# Patient Record
Sex: FEMALE | Race: WHITE | NOT HISPANIC OR LATINO | Employment: OTHER | URBAN - METROPOLITAN AREA
[De-identification: names, ages, dates, MRNs, and addresses within clinical notes are randomized per-mention and may not be internally consistent; named-entity substitution may affect disease eponyms.]

---

## 2023-04-12 ENCOUNTER — TELEPHONE (OUTPATIENT)
Dept: PRIMARY CARE | Facility: CLINIC | Age: 78
End: 2023-04-12
Payer: MEDICARE

## 2023-04-12 DIAGNOSIS — E03.9 ACQUIRED HYPOTHYROIDISM: Primary | ICD-10-CM

## 2023-04-12 RX ORDER — LEVOTHYROXINE SODIUM 88 UG/1
88 TABLET ORAL DAILY
COMMUNITY
End: 2023-04-12 | Stop reason: SDUPTHER

## 2023-04-13 RX ORDER — LEVOTHYROXINE SODIUM 88 UG/1
88 TABLET ORAL DAILY
Qty: 30 TABLET | Refills: 0 | Status: SHIPPED | OUTPATIENT
Start: 2023-04-13 | End: 2023-05-11 | Stop reason: SDUPTHER

## 2023-04-19 ENCOUNTER — LAB (OUTPATIENT)
Dept: LAB | Facility: LAB | Age: 78
End: 2023-04-19
Payer: MEDICARE

## 2023-04-19 ENCOUNTER — OFFICE VISIT (OUTPATIENT)
Dept: PRIMARY CARE | Facility: CLINIC | Age: 78
End: 2023-04-19
Payer: MEDICARE

## 2023-04-19 VITALS
TEMPERATURE: 97.2 F | SYSTOLIC BLOOD PRESSURE: 110 MMHG | HEIGHT: 65 IN | BODY MASS INDEX: 21.69 KG/M2 | WEIGHT: 130.2 LBS | HEART RATE: 72 BPM | DIASTOLIC BLOOD PRESSURE: 60 MMHG | RESPIRATION RATE: 16 BRPM

## 2023-04-19 DIAGNOSIS — M54.50 CHRONIC BILATERAL LOW BACK PAIN WITHOUT SCIATICA: ICD-10-CM

## 2023-04-19 DIAGNOSIS — E78.5 DYSLIPIDEMIA: ICD-10-CM

## 2023-04-19 DIAGNOSIS — Z00.00 ROUTINE GENERAL MEDICAL EXAMINATION AT HEALTH CARE FACILITY: ICD-10-CM

## 2023-04-19 DIAGNOSIS — Z00.00 MEDICARE ANNUAL WELLNESS VISIT, SUBSEQUENT: Primary | ICD-10-CM

## 2023-04-19 DIAGNOSIS — Z12.31 ENCOUNTER FOR SCREENING MAMMOGRAM FOR MALIGNANT NEOPLASM OF BREAST: ICD-10-CM

## 2023-04-19 DIAGNOSIS — E53.8 VITAMIN B12 DEFICIENCY: ICD-10-CM

## 2023-04-19 DIAGNOSIS — R53.1 WEAKNESS GENERALIZED: ICD-10-CM

## 2023-04-19 DIAGNOSIS — G89.29 CHRONIC BILATERAL LOW BACK PAIN WITHOUT SCIATICA: ICD-10-CM

## 2023-04-19 DIAGNOSIS — E03.9 ACQUIRED HYPOTHYROIDISM: ICD-10-CM

## 2023-04-19 DIAGNOSIS — D72.819 LEUKOPENIA, UNSPECIFIED TYPE: ICD-10-CM

## 2023-04-19 DIAGNOSIS — M19.90 OSTEOARTHRITIS, UNSPECIFIED OSTEOARTHRITIS TYPE, UNSPECIFIED SITE: ICD-10-CM

## 2023-04-19 DIAGNOSIS — E55.9 VITAMIN D DEFICIENCY: ICD-10-CM

## 2023-04-19 DIAGNOSIS — M81.0 AGE-RELATED OSTEOPOROSIS WITHOUT CURRENT PATHOLOGICAL FRACTURE: ICD-10-CM

## 2023-04-19 DIAGNOSIS — R91.1 NODULE OF RIGHT LUNG: ICD-10-CM

## 2023-04-19 PROBLEM — C44.319 BASAL CELL CARCINOMA OF CHEEK: Status: ACTIVE | Noted: 2023-04-19

## 2023-04-19 PROBLEM — M95.2 MOHS DEFECT OF CHEEK: Status: ACTIVE | Noted: 2023-04-19

## 2023-04-19 PROBLEM — M85.80 OSTEOPENIA: Status: ACTIVE | Noted: 2023-04-19

## 2023-04-19 PROBLEM — M47.812 CERVICAL ARTHRITIS: Status: ACTIVE | Noted: 2023-04-19

## 2023-04-19 PROBLEM — M54.2 CERVICAL MUSCLE PAIN: Status: ACTIVE | Noted: 2023-04-19

## 2023-04-19 PROBLEM — Z98.890 MOHS DEFECT OF CHEEK: Status: ACTIVE | Noted: 2023-04-19

## 2023-04-19 PROBLEM — M48.00 SPINAL STENOSIS: Status: ACTIVE | Noted: 2023-04-19

## 2023-04-19 PROBLEM — I65.29 CAROTID ARTERY CALCIFICATION: Status: ACTIVE | Noted: 2023-04-19

## 2023-04-19 PROBLEM — H93.13 BILATERAL TINNITUS: Status: ACTIVE | Noted: 2022-03-02

## 2023-04-19 PROBLEM — H91.10 PRESBYCUSIS: Status: ACTIVE | Noted: 2023-04-19

## 2023-04-19 PROBLEM — H66.92 LEFT OTITIS MEDIA: Status: ACTIVE | Noted: 2022-03-02

## 2023-04-19 PROBLEM — R51.9 HEADACHE: Status: ACTIVE | Noted: 2023-04-19

## 2023-04-19 PROBLEM — B02.9 SHINGLES: Status: ACTIVE | Noted: 2023-04-19

## 2023-04-19 PROBLEM — M25.551 HIP PAIN, RIGHT: Status: ACTIVE | Noted: 2023-04-19

## 2023-04-19 PROBLEM — H35.30 MACULAR DEGENERATION: Status: ACTIVE | Noted: 2023-04-19

## 2023-04-19 LAB
ALANINE AMINOTRANSFERASE (SGPT) (U/L) IN SER/PLAS: 17 U/L (ref 7–45)
ALBUMIN (G/DL) IN SER/PLAS: 4.4 G/DL (ref 3.4–5)
ALKALINE PHOSPHATASE (U/L) IN SER/PLAS: 69 U/L (ref 33–136)
ANION GAP IN SER/PLAS: 12 MMOL/L (ref 10–20)
ASPARTATE AMINOTRANSFERASE (SGOT) (U/L) IN SER/PLAS: 23 U/L (ref 9–39)
BASOPHILS (10*3/UL) IN BLOOD BY AUTOMATED COUNT: 0.05 X10E9/L (ref 0–0.1)
BASOPHILS/100 LEUKOCYTES IN BLOOD BY AUTOMATED COUNT: 1 % (ref 0–2)
BILIRUBIN TOTAL (MG/DL) IN SER/PLAS: 0.5 MG/DL (ref 0–1.2)
CALCIDIOL (25 OH VITAMIN D3) (NG/ML) IN SER/PLAS: 32 NG/ML
CALCIUM (MG/DL) IN SER/PLAS: 9.7 MG/DL (ref 8.6–10.3)
CARBON DIOXIDE, TOTAL (MMOL/L) IN SER/PLAS: 27 MMOL/L (ref 21–32)
CHLORIDE (MMOL/L) IN SER/PLAS: 103 MMOL/L (ref 98–107)
CHOLESTEROL (MG/DL) IN SER/PLAS: 181 MG/DL (ref 0–199)
CHOLESTEROL IN HDL (MG/DL) IN SER/PLAS: 56.7 MG/DL
CHOLESTEROL/HDL RATIO: 3.2
COBALAMIN (VITAMIN B12) (PG/ML) IN SER/PLAS: 690 PG/ML (ref 211–911)
CREATININE (MG/DL) IN SER/PLAS: 0.85 MG/DL (ref 0.5–1.05)
EOSINOPHILS (10*3/UL) IN BLOOD BY AUTOMATED COUNT: 0.24 X10E9/L (ref 0–0.4)
EOSINOPHILS/100 LEUKOCYTES IN BLOOD BY AUTOMATED COUNT: 4.9 % (ref 0–6)
ERYTHROCYTE DISTRIBUTION WIDTH (RATIO) BY AUTOMATED COUNT: 12.3 % (ref 11.5–14.5)
ERYTHROCYTE MEAN CORPUSCULAR HEMOGLOBIN CONCENTRATION (G/DL) BY AUTOMATED: 33.7 G/DL (ref 32–36)
ERYTHROCYTE MEAN CORPUSCULAR VOLUME (FL) BY AUTOMATED COUNT: 93 FL (ref 80–100)
ERYTHROCYTES (10*6/UL) IN BLOOD BY AUTOMATED COUNT: 4.01 X10E12/L (ref 4–5.2)
GFR FEMALE: 70 ML/MIN/1.73M2
GLUCOSE (MG/DL) IN SER/PLAS: 85 MG/DL (ref 74–99)
HEMATOCRIT (%) IN BLOOD BY AUTOMATED COUNT: 37.1 % (ref 36–46)
HEMOGLOBIN (G/DL) IN BLOOD: 12.5 G/DL (ref 12–16)
IMMATURE GRANULOCYTES/100 LEUKOCYTES IN BLOOD BY AUTOMATED COUNT: 0.2 % (ref 0–0.9)
LDL: 105 MG/DL (ref 0–99)
LEUKOCYTES (10*3/UL) IN BLOOD BY AUTOMATED COUNT: 4.9 X10E9/L (ref 4.4–11.3)
LYMPHOCYTES (10*3/UL) IN BLOOD BY AUTOMATED COUNT: 1.33 X10E9/L (ref 0.8–3)
LYMPHOCYTES/100 LEUKOCYTES IN BLOOD BY AUTOMATED COUNT: 26.9 % (ref 13–44)
MONOCYTES (10*3/UL) IN BLOOD BY AUTOMATED COUNT: 0.75 X10E9/L (ref 0.05–0.8)
MONOCYTES/100 LEUKOCYTES IN BLOOD BY AUTOMATED COUNT: 15.2 % (ref 2–10)
NEUTROPHILS (10*3/UL) IN BLOOD BY AUTOMATED COUNT: 2.56 X10E9/L (ref 1.6–5.5)
NEUTROPHILS/100 LEUKOCYTES IN BLOOD BY AUTOMATED COUNT: 51.8 % (ref 40–80)
PLATELETS (10*3/UL) IN BLOOD AUTOMATED COUNT: 230 X10E9/L (ref 150–450)
POTASSIUM (MMOL/L) IN SER/PLAS: 4.4 MMOL/L (ref 3.5–5.3)
PROTEIN TOTAL: 7 G/DL (ref 6.4–8.2)
SODIUM (MMOL/L) IN SER/PLAS: 138 MMOL/L (ref 136–145)
THYROTROPIN (MIU/L) IN SER/PLAS BY DETECTION LIMIT <= 0.05 MIU/L: 2.03 MIU/L (ref 0.44–3.98)
TRIGLYCERIDE (MG/DL) IN SER/PLAS: 98 MG/DL (ref 0–149)
UREA NITROGEN (MG/DL) IN SER/PLAS: 17 MG/DL (ref 6–23)
VLDL: 20 MG/DL (ref 0–40)

## 2023-04-19 PROCEDURE — 1160F RVW MEDS BY RX/DR IN RCRD: CPT | Performed by: INTERNAL MEDICINE

## 2023-04-19 PROCEDURE — 84443 ASSAY THYROID STIM HORMONE: CPT

## 2023-04-19 PROCEDURE — 85025 COMPLETE CBC W/AUTO DIFF WBC: CPT

## 2023-04-19 PROCEDURE — 80061 LIPID PANEL: CPT

## 2023-04-19 PROCEDURE — 1170F FXNL STATUS ASSESSED: CPT | Performed by: INTERNAL MEDICINE

## 2023-04-19 PROCEDURE — 1159F MED LIST DOCD IN RCRD: CPT | Performed by: INTERNAL MEDICINE

## 2023-04-19 PROCEDURE — 82306 VITAMIN D 25 HYDROXY: CPT

## 2023-04-19 PROCEDURE — G0439 PPPS, SUBSEQ VISIT: HCPCS | Performed by: INTERNAL MEDICINE

## 2023-04-19 PROCEDURE — 36415 COLL VENOUS BLD VENIPUNCTURE: CPT

## 2023-04-19 PROCEDURE — 80053 COMPREHEN METABOLIC PANEL: CPT

## 2023-04-19 PROCEDURE — 82607 VITAMIN B-12: CPT

## 2023-04-19 RX ORDER — CALCIUM CARBONATE 500(1250)
TABLET,CHEWABLE ORAL
COMMUNITY

## 2023-04-19 RX ORDER — CYANOCOBALAMIN 1000 UG/ML
1000 INJECTION, SOLUTION INTRAMUSCULAR; SUBCUTANEOUS
COMMUNITY
End: 2023-06-02 | Stop reason: ALTCHOICE

## 2023-04-19 RX ORDER — CALCIUM CARBONATE/VITAMIN D3 600MG-5MCG
2 TABLET ORAL DAILY
COMMUNITY

## 2023-04-19 ASSESSMENT — PATIENT HEALTH QUESTIONNAIRE - PHQ9
2. FEELING DOWN, DEPRESSED OR HOPELESS: NOT AT ALL
SUM OF ALL RESPONSES TO PHQ9 QUESTIONS 1 AND 2: 0
1. LITTLE INTEREST OR PLEASURE IN DOING THINGS: NOT AT ALL

## 2023-04-19 ASSESSMENT — ACTIVITIES OF DAILY LIVING (ADL)
GROCERY_SHOPPING: INDEPENDENT
DRESSING: INDEPENDENT
BATHING: INDEPENDENT
TAKING_MEDICATION: INDEPENDENT
DOING_HOUSEWORK: INDEPENDENT
MANAGING_FINANCES: INDEPENDENT

## 2023-04-19 ASSESSMENT — ENCOUNTER SYMPTOMS
CONSTIPATION: 0
CHEST TIGHTNESS: 0
SLEEP DISTURBANCE: 0
ADENOPATHY: 0
DYSURIA: 0
UNEXPECTED WEIGHT CHANGE: 0
CHILLS: 0
WEAKNESS: 0
WHEEZING: 0
NAUSEA: 0
PALPITATIONS: 0
ABDOMINAL PAIN: 1
SORE THROAT: 0
CONFUSION: 0
ARTHRALGIAS: 1
SHORTNESS OF BREATH: 0
DIZZINESS: 1
NECK PAIN: 1
COUGH: 0
VOMITING: 0
FATIGUE: 1
DIARRHEA: 0
JOINT SWELLING: 0

## 2023-04-19 NOTE — PROGRESS NOTES
"Subjective   Reason for Visit: Yumiko Richter is an 77 y.o. female here for a Medicare Wellness visit.     Past Medical, Surgical, and Family History reviewed and updated in chart.    Reviewed all medications by prescribing practitioner or clinical pharmacist (such as prescriptions, OTCs, herbal therapies and supplements) and documented in the medical record.    AWV  Dexa-12.3.2021  Mammo-12.3.2021  Colonoscopy-2.9.2022-q10  Patient decline any vaccines today,no flu or covid vaccines done  R upper abd area pain 2-3 months now on/of aching pain     Feels more  fatigue lately, had vit b12 def, is on vit b12 inj qmonth,     Patient Care Team:  Syed Trinh MD as PCP - General  Syed Trinh MD as PCP - [employer] PCP     Review of Systems   Constitutional:  Positive for fatigue. Negative for chills and unexpected weight change.        Comment   HENT:  Negative for congestion, ear pain and sore throat.    Respiratory:  Negative for cough, chest tightness, shortness of breath and wheezing.    Cardiovascular:  Negative for palpitations and leg swelling.   Gastrointestinal:  Positive for abdominal pain. Negative for constipation, diarrhea, nausea and vomiting.        R upper abd area x 2-3 months on/off ,ache   Genitourinary:  Negative for dysuria and urgency.   Musculoskeletal:  Positive for arthralgias and neck pain. Negative for joint swelling.   Skin:  Negative for rash.   Neurological:  Positive for dizziness. Negative for weakness.   Hematological:  Negative for adenopathy.   Psychiatric/Behavioral:  Negative for confusion and sleep disturbance.    All other systems reviewed and are negative.      Objective   Vitals:  /60 (BP Location: Left arm, Patient Position: Sitting)   Pulse 72   Temp 36.2 °C (97.2 °F)   Resp 16   Ht 1.651 m (5' 5\")   Wt 59.1 kg (130 lb 3.2 oz)   BMI 21.67 kg/m²       Physical Exam  Constitutional:       Appearance: Normal appearance.   HENT:      Head: " Normocephalic and atraumatic.   Eyes:      Conjunctiva/sclera: Conjunctivae normal.   Neck:      Vascular: No carotid bruit.   Cardiovascular:      Rate and Rhythm: Normal rate and regular rhythm.      Heart sounds: No murmur heard.  Pulmonary:      Effort: No respiratory distress.      Breath sounds: No wheezing, rhonchi or rales.   Chest:      Chest wall: No tenderness.   Abdominal:      General: Bowel sounds are normal. There is no distension.      Palpations: Abdomen is soft. There is no mass.      Tenderness: There is no abdominal tenderness.   Musculoskeletal:      Cervical back: Neck supple.   Lymphadenopathy:      Cervical: No cervical adenopathy.   Skin:     Coloration: Skin is not jaundiced.      Findings: No rash.   Neurological:      General: No focal deficit present.      Mental Status: She is alert and oriented to person, place, and time. Mental status is at baseline.      Motor: No weakness.      Gait: Gait normal.   Psychiatric:         Mood and Affect: Mood normal.         Behavior: Behavior normal.         Judgment: Judgment normal.         Assessment/Plan   Problem List Items Addressed This Visit       Dyslipidemia    Hypothyroidism    Leucopenia    Nodule of right lung    Current Assessment & Plan     Will fu with ct chest         Osteoarthritis    Vitamin B12 deficiency    Vitamin D deficiency    Weakness generalized     Other Visit Diagnoses       Medicare annual wellness visit, subsequent    -  Primary    Routine general medical examination at health care facility

## 2023-05-11 ENCOUNTER — TELEPHONE (OUTPATIENT)
Dept: PRIMARY CARE | Facility: CLINIC | Age: 78
End: 2023-05-11
Payer: MEDICARE

## 2023-05-11 DIAGNOSIS — E03.9 ACQUIRED HYPOTHYROIDISM: Primary | ICD-10-CM

## 2023-05-12 RX ORDER — LEVOTHYROXINE SODIUM 88 UG/1
88 TABLET ORAL DAILY
Qty: 90 TABLET | Refills: 0 | Status: SHIPPED | OUTPATIENT
Start: 2023-05-12 | End: 2023-08-14 | Stop reason: SDUPTHER

## 2023-06-02 ENCOUNTER — OFFICE VISIT (OUTPATIENT)
Dept: PRIMARY CARE | Facility: CLINIC | Age: 78
End: 2023-06-02
Payer: MEDICARE

## 2023-06-02 VITALS
RESPIRATION RATE: 16 BRPM | WEIGHT: 130.8 LBS | SYSTOLIC BLOOD PRESSURE: 122 MMHG | HEART RATE: 69 BPM | OXYGEN SATURATION: 97 % | HEIGHT: 65 IN | DIASTOLIC BLOOD PRESSURE: 68 MMHG | BODY MASS INDEX: 21.79 KG/M2 | TEMPERATURE: 97.3 F

## 2023-06-02 DIAGNOSIS — Z00.00 ANNUAL PHYSICAL EXAM: Primary | ICD-10-CM

## 2023-06-02 DIAGNOSIS — Z13.6 SCREENING FOR CARDIOVASCULAR CONDITION: ICD-10-CM

## 2023-06-02 DIAGNOSIS — E55.9 VITAMIN D DEFICIENCY: ICD-10-CM

## 2023-06-02 DIAGNOSIS — R91.1 NODULE OF RIGHT LUNG: ICD-10-CM

## 2023-06-02 DIAGNOSIS — E78.5 DYSLIPIDEMIA: ICD-10-CM

## 2023-06-02 DIAGNOSIS — D72.819 LEUKOPENIA, UNSPECIFIED TYPE: ICD-10-CM

## 2023-06-02 DIAGNOSIS — E53.8 VITAMIN B12 DEFICIENCY: ICD-10-CM

## 2023-06-02 DIAGNOSIS — E03.9 ACQUIRED HYPOTHYROIDISM: ICD-10-CM

## 2023-06-02 PROBLEM — M17.12 PRIMARY OSTEOARTHRITIS OF LEFT KNEE: Status: ACTIVE | Noted: 2022-02-23

## 2023-06-02 PROBLEM — M17.0 PRIMARY OSTEOARTHRITIS OF BOTH KNEES: Status: ACTIVE | Noted: 2022-02-23

## 2023-06-02 PROCEDURE — G0446 INTENS BEHAVE THER CARDIO DX: HCPCS | Performed by: INTERNAL MEDICINE

## 2023-06-02 PROCEDURE — 99214 OFFICE O/P EST MOD 30 MIN: CPT | Performed by: INTERNAL MEDICINE

## 2023-06-02 PROCEDURE — 99497 ADVNCD CARE PLAN 30 MIN: CPT | Performed by: INTERNAL MEDICINE

## 2023-06-02 PROCEDURE — G0444 DEPRESSION SCREEN ANNUAL: HCPCS | Performed by: INTERNAL MEDICINE

## 2023-06-02 PROCEDURE — 99397 PER PM REEVAL EST PAT 65+ YR: CPT | Performed by: INTERNAL MEDICINE

## 2023-06-02 PROCEDURE — 1160F RVW MEDS BY RX/DR IN RCRD: CPT | Performed by: INTERNAL MEDICINE

## 2023-06-02 PROCEDURE — 1159F MED LIST DOCD IN RCRD: CPT | Performed by: INTERNAL MEDICINE

## 2023-06-02 RX ORDER — LANOLIN ALCOHOL/MO/W.PET/CERES
500 CREAM (GRAM) TOPICAL DAILY
Qty: 45 TABLET | Refills: 3 | Status: SHIPPED | OUTPATIENT
Start: 2023-06-02 | End: 2024-02-12 | Stop reason: DRUGHIGH

## 2023-06-02 ASSESSMENT — ENCOUNTER SYMPTOMS
NAUSEA: 0
PALPITATIONS: 0
ADENOPATHY: 0
SLEEP DISTURBANCE: 0
ARTHRALGIAS: 0
WEAKNESS: 0
CONFUSION: 0
FATIGUE: 0
DYSURIA: 0
CHILLS: 0
COUGH: 0
SORE THROAT: 0
DIZZINESS: 0
UNEXPECTED WEIGHT CHANGE: 0
DIARRHEA: 0
CHEST TIGHTNESS: 0
WHEEZING: 0
JOINT SWELLING: 0
VOMITING: 0
BACK PAIN: 1
SHORTNESS OF BREATH: 0
ABDOMINAL PAIN: 0
CONSTIPATION: 0

## 2023-06-02 NOTE — ASSESSMENT & PLAN NOTE
I have discussed the cardiovascular risk and behavioral modification, nutrition, exercise and elimination of habits contributing to risk. We agreed to a plan how to reduce the risk.  CV risk estimate calculates 20.2%  Will do ct calcium score

## 2023-06-02 NOTE — PROGRESS NOTES
"Subjective   Patient ID: Yumiko Richter is a 77 y.o. female who presents for Annual Exam (CPE/Labs- 4.2023/CT chest -5.2023).    CPE  Labs-4.2023  CT chest 5.2023  Colonoscopy- 2.9.2022-10y  Dexa-12.3.2021  Mammo-12.3.2021       Has living will in chart  Review of Systems   Constitutional:  Negative for chills, fatigue and unexpected weight change.        Comment   HENT:  Negative for congestion, ear pain and sore throat.    Respiratory:  Negative for cough, chest tightness, shortness of breath and wheezing.    Cardiovascular:  Negative for palpitations and leg swelling.   Gastrointestinal:  Negative for abdominal pain, constipation, diarrhea, nausea and vomiting.   Genitourinary:  Negative for dysuria and urgency.   Musculoskeletal:  Positive for back pain. Negative for arthralgias and joint swelling.        Upper back area sometimes   Skin:  Negative for rash.   Neurological:  Negative for dizziness and weakness.   Hematological:  Negative for adenopathy.   Psychiatric/Behavioral:  Negative for confusion and sleep disturbance.    All other systems reviewed and are negative.      Objective   /68 (BP Location: Left arm)   Pulse 69   Temp 36.3 °C (97.3 °F)   Resp 16   Ht 1.651 m (5' 5\")   Wt 59.3 kg (130 lb 12.8 oz)   SpO2 97% Comment: RA  BMI 21.77 kg/m²     Physical Exam  Constitutional:       Appearance: Normal appearance.   HENT:      Head: Normocephalic and atraumatic.   Eyes:      Conjunctiva/sclera: Conjunctivae normal.   Neck:      Vascular: No carotid bruit.   Cardiovascular:      Rate and Rhythm: Normal rate and regular rhythm.      Heart sounds: No murmur heard.  Pulmonary:      Effort: No respiratory distress.      Breath sounds: No wheezing, rhonchi or rales.   Chest:      Chest wall: No tenderness.   Abdominal:      General: Bowel sounds are normal. There is no distension.      Palpations: Abdomen is soft. There is no mass.      Tenderness: There is no abdominal tenderness.   Musculoskeletal:   "       General: Normal range of motion.      Cervical back: Neck supple.   Lymphadenopathy:      Cervical: No cervical adenopathy.   Skin:     Coloration: Skin is not jaundiced.      Findings: No rash.   Neurological:      General: No focal deficit present.      Mental Status: She is alert and oriented to person, place, and time. Mental status is at baseline.      Motor: No weakness.      Gait: Gait normal.   Psychiatric:         Mood and Affect: Mood normal.         Behavior: Behavior normal.         Judgment: Judgment normal.         Assessment/Plan   Problem List Items Addressed This Visit       Dyslipidemia    Hypothyroidism    Leucopenia     resolved         Nodule of right lung     Ct has stable from 2017, on ct 2023.  No more ct lung         Vitamin B12 deficiency     Was on b=vitb12 inj  once /month + po every day  Thomas;l stop injection and continue po, recheck in 6 month         Relevant Medications    cyanocobalamin (Vitamin B-12) 1,000 mcg tablet    Other Relevant Orders    Vitamin B12    Vitamin D deficiency    Screening for cardiovascular condition - Primary     I have discussed the cardiovascular risk and behavioral modification, nutrition, exercise and elimination of habits contributing to risk. We agreed to a plan how to reduce the risk.  CV risk estimate calculates 20.2%  Will do ct calcium score

## 2023-06-02 NOTE — ASSESSMENT & PLAN NOTE
Was on b=vitb12 inj  once /month + po every day  Thomas;l stop injection and continue po, recheck in 6 month

## 2023-08-04 ENCOUNTER — APPOINTMENT (OUTPATIENT)
Dept: PRIMARY CARE | Facility: CLINIC | Age: 78
End: 2023-08-04
Payer: MEDICARE

## 2023-08-14 ENCOUNTER — TELEPHONE (OUTPATIENT)
Dept: PRIMARY CARE | Facility: CLINIC | Age: 78
End: 2023-08-14
Payer: MEDICARE

## 2023-08-14 DIAGNOSIS — E03.9 ACQUIRED HYPOTHYROIDISM: Primary | ICD-10-CM

## 2023-08-14 RX ORDER — LEVOTHYROXINE SODIUM 88 UG/1
88 TABLET ORAL DAILY
Qty: 90 TABLET | Refills: 3 | Status: SHIPPED | OUTPATIENT
Start: 2023-08-14

## 2023-08-14 NOTE — TELEPHONE ENCOUNTER
Refill 90 day supply w/Refills  to [facility]    -levothyroxine (Synthroid, Levoxyl) 88 mcg tablet 1XDAY

## 2023-09-11 ENCOUNTER — OFFICE VISIT (OUTPATIENT)
Dept: PRIMARY CARE | Facility: CLINIC | Age: 78
End: 2023-09-11
Payer: MEDICARE

## 2023-09-11 VITALS
TEMPERATURE: 97.3 F | BODY MASS INDEX: 21.76 KG/M2 | OXYGEN SATURATION: 97 % | RESPIRATION RATE: 18 BRPM | WEIGHT: 130.6 LBS | SYSTOLIC BLOOD PRESSURE: 120 MMHG | DIASTOLIC BLOOD PRESSURE: 60 MMHG | HEIGHT: 65 IN | HEART RATE: 76 BPM

## 2023-09-11 DIAGNOSIS — R05.1 ACUTE COUGH: ICD-10-CM

## 2023-09-11 DIAGNOSIS — M54.6 BILATERAL THORACIC BACK PAIN, UNSPECIFIED CHRONICITY: ICD-10-CM

## 2023-09-11 DIAGNOSIS — J04.0 ACUTE SIMPLE LARYNGITIS: ICD-10-CM

## 2023-09-11 DIAGNOSIS — R91.8 LUNG NODULES: Primary | ICD-10-CM

## 2023-09-11 PROCEDURE — 1160F RVW MEDS BY RX/DR IN RCRD: CPT | Performed by: INTERNAL MEDICINE

## 2023-09-11 PROCEDURE — 99214 OFFICE O/P EST MOD 30 MIN: CPT | Performed by: INTERNAL MEDICINE

## 2023-09-11 PROCEDURE — 1159F MED LIST DOCD IN RCRD: CPT | Performed by: INTERNAL MEDICINE

## 2023-09-11 RX ORDER — METHYLPREDNISOLONE 4 MG/1
TABLET ORAL
Qty: 21 TABLET | Refills: 0 | Status: SHIPPED | OUTPATIENT
Start: 2023-09-11 | End: 2024-02-12 | Stop reason: ALTCHOICE

## 2023-09-11 RX ORDER — BENZONATATE 100 MG/1
100 CAPSULE ORAL 3 TIMES DAILY PRN
Qty: 42 CAPSULE | Refills: 0 | Status: SHIPPED | OUTPATIENT
Start: 2023-09-11 | End: 2023-10-11

## 2023-09-11 ASSESSMENT — PATIENT HEALTH QUESTIONNAIRE - PHQ9
1. LITTLE INTEREST OR PLEASURE IN DOING THINGS: NOT AT ALL
2. FEELING DOWN, DEPRESSED OR HOPELESS: NOT AT ALL
SUM OF ALL RESPONSES TO PHQ9 QUESTIONS 1 AND 2: 0

## 2023-09-11 ASSESSMENT — ENCOUNTER SYMPTOMS: COUGH: 1

## 2023-09-11 NOTE — PROGRESS NOTES
"Subjective   Yumiko Richter is a 78 y.o. female who presents for Cough (Cough - dry x 1 week ).  Dry cough x 1 week dry, did not take ant TX OTC, drink fide, honey QAM, no other URI symptoms, no NVD, no chills,puts a pillow higher at night to sleep and helps. Her  used bleach in basement and she started with sore throat and cough that night. Working like a   , ant tolerate some of the .  Lost her voice few days ago too, is better now.  CT chest done 5.9.2023- to talk today regarding if to repeat in future or not    in the room with patient today    Eats a lot of tomatoes and grapes    Cough  This is a recurrent problem. The current episode started in the past 7 days. The problem has been unchanged. The problem occurs constantly. The cough is Non-productive. The symptoms are aggravated by lying down. She has tried nothing for the symptoms. There is no history of asthma.     Review of Systems   Respiratory:  Positive for cough.        Objective   Physical Exam  Constitutional:       Appearance: Normal appearance.   HENT:      Head: Normocephalic and atraumatic.   Eyes:      Pupils: Pupils are equal, round, and reactive to light.   Cardiovascular:      Rate and Rhythm: Normal rate and regular rhythm.   Pulmonary:      Effort: Pulmonary effort is normal.      Breath sounds: Normal breath sounds.   Musculoskeletal:         General: Normal range of motion.      Cervical back: Normal range of motion and neck supple.   Skin:     General: Skin is warm.   Neurological:      General: No focal deficit present.      Mental Status: She is alert and oriented to person, place, and time.   Psychiatric:         Mood and Affect: Mood normal.         Behavior: Behavior normal.       /60 (BP Location: Left arm, Patient Position: Sitting)   Pulse 76   Temp 36.3 °C (97.3 °F)   Resp 18   Ht 1.651 m (5' 5\")   Wt 59.2 kg (130 lb 9.6 oz)   SpO2 97% Comment: RA  BMI 21.73 kg/m²   === 04/19/23 " ===    CT CHEST WO IV CONTRAST    - Impression -  Stable bilateral subcentimeter pulmonary nodules compared to studies  dating back to 03/15/2017. Many of these nodules are stable compared  to older study from 03/18/2014.  No new or enlarging pulmonary nodule.  Fleischner guidelines: Multiple non-calcified pulmonary nodules  measuring less than 6 mm, likely benign. No further follow-up is  required, however, if the patient has high risk factors for primary  lung malignancy, follow-up noncontrast CT scan chest in 12 months may  be obtained. (Ron López et al., Guidelines for management of  incidental pulmonary nodules detected on CT images: From the  Fleischner Society 2017, Radiology. 2017 Jul;284 (1):228-243.)  [email or web address]      Assessment/Plan   Problem List Items Addressed This Visit       Lung nodules - Primary     Stable on ct 2023 since 2014, not smoking, no need for further ct         Acute cough     Other Visit Diagnoses       Acute simple laryngitis        possible sec to iritation from clorine    Bilateral thoracic back pain, unspecified chronicity

## 2023-12-04 ENCOUNTER — APPOINTMENT (OUTPATIENT)
Dept: PRIMARY CARE | Facility: CLINIC | Age: 78
End: 2023-12-04
Payer: MEDICARE

## 2023-12-07 ENCOUNTER — OFFICE VISIT (OUTPATIENT)
Dept: PRIMARY CARE | Facility: CLINIC | Age: 78
End: 2023-12-07
Payer: MEDICARE

## 2023-12-07 ENCOUNTER — LAB (OUTPATIENT)
Dept: LAB | Facility: LAB | Age: 78
End: 2023-12-07
Payer: MEDICARE

## 2023-12-07 VITALS
OXYGEN SATURATION: 97 % | DIASTOLIC BLOOD PRESSURE: 64 MMHG | TEMPERATURE: 98.2 F | HEIGHT: 65 IN | HEART RATE: 78 BPM | BODY MASS INDEX: 21.46 KG/M2 | SYSTOLIC BLOOD PRESSURE: 124 MMHG | WEIGHT: 128.8 LBS | RESPIRATION RATE: 16 BRPM

## 2023-12-07 DIAGNOSIS — E78.5 DYSLIPIDEMIA: ICD-10-CM

## 2023-12-07 DIAGNOSIS — E53.8 VITAMIN B12 DEFICIENCY: ICD-10-CM

## 2023-12-07 DIAGNOSIS — Z00.00 MEDICARE ANNUAL WELLNESS VISIT, SUBSEQUENT: ICD-10-CM

## 2023-12-07 DIAGNOSIS — M79.604 LEG PAIN, ANTERIOR, RIGHT: ICD-10-CM

## 2023-12-07 DIAGNOSIS — Z00.00 HEALTH CARE MAINTENANCE: ICD-10-CM

## 2023-12-07 DIAGNOSIS — M81.0 AGE-RELATED OSTEOPOROSIS WITHOUT CURRENT PATHOLOGICAL FRACTURE: Primary | ICD-10-CM

## 2023-12-07 LAB — VIT B12 SERPL-MCNC: 292 PG/ML (ref 211–911)

## 2023-12-07 PROCEDURE — 99214 OFFICE O/P EST MOD 30 MIN: CPT | Performed by: INTERNAL MEDICINE

## 2023-12-07 PROCEDURE — 1160F RVW MEDS BY RX/DR IN RCRD: CPT | Performed by: INTERNAL MEDICINE

## 2023-12-07 PROCEDURE — 82607 VITAMIN B-12: CPT

## 2023-12-07 PROCEDURE — 36415 COLL VENOUS BLD VENIPUNCTURE: CPT

## 2023-12-07 PROCEDURE — 1159F MED LIST DOCD IN RCRD: CPT | Performed by: INTERNAL MEDICINE

## 2023-12-07 PROCEDURE — 86803 HEPATITIS C AB TEST: CPT

## 2023-12-07 RX ORDER — LIDOCAINE 50 MG/G
1 PATCH TOPICAL DAILY
Qty: 30 PATCH | Refills: 3 | Status: SHIPPED | OUTPATIENT
Start: 2023-12-07 | End: 2024-02-12 | Stop reason: ALTCHOICE

## 2023-12-07 ASSESSMENT — ENCOUNTER SYMPTOMS
SHORTNESS OF BREATH: 0
CHEST TIGHTNESS: 0
NAUSEA: 0
ABDOMINAL PAIN: 0
COUGH: 0
DIARRHEA: 0
WEAKNESS: 0
UNEXPECTED WEIGHT CHANGE: 0
VOMITING: 0
CHILLS: 0
JOINT SWELLING: 0
CONFUSION: 0
WHEEZING: 0
CONSTIPATION: 0
ADENOPATHY: 0
SLEEP DISTURBANCE: 0
DIZZINESS: 0
FATIGUE: 0
SORE THROAT: 0
PALPITATIONS: 0
DYSURIA: 0
ARTHRALGIAS: 0

## 2023-12-07 NOTE — PATIENT INSTRUCTIONS
-Was nice seeing you today.  Continue same medication.  Have lab work done before next appointment if labs were ordered today.  Fu in 6 month.  Call/ contact our office with any concerns.       Maintaining good vitamin D blood levels is important for bone health and overall health. Please see additional information on vitamin D below.  A vitamin D blood test, called vitamin D 25-hydroxy (OH) is obtained to assess vitamin D level. If the vitamin D is low, you will need to take a vitamin D supplement. If you are already on a vitamin D supplement, then the dose will need to be adjusted.  Also you multivitamin may contain vitamin D.  Vitamin D is a fat soluble vitamin that requires it be taken with good fat to be absorbed. Examples of healthy fat include nuts, seeds, avocados, olives and for the most part the meal of the day.  Spending up to 30 minutes in the sun during Summer and late Spring can provide natural vitamin D to the uncovered skin (arms, legs)    - Your calcium can be sufficient in your diet.  Healthy food that are rich in calcium include: nuts, seeds, legumes/beans, peas, dark green leafy vegetables, plant based milk  Additional calcium rich foods listed below  - Soaking Almonds overnight in the fridge with drinking water, can help with softening the almonds and improved absorption of the almonds. Please be careful not to break a tooth with dry raw almonds, or other hard nuts or seeds.  If your lab work shows insufficient calcium or you are unable to consume sufficient foods rich in calcium, then a calcium supplement is recommended, such as calcium citrate.    - You can track your nutrition and calcium intake on [email or web address]  This provides macro and micronutrient intake and requirements.  - You can track your calcium intake on [email or web address] or any other calcium tracker of your choice.  If you are not getting about 1200 mg of calcium daily, you will need to add a calcium supplement, such as calcium  citrate to supplement you daily calcium so you can achieve your total 1200 mg daily  See additional information below on calcium.    - I recommend following a healthy lifestyle. You can find additional information below.      - You can track your nutrition and calcium intake on [email or web address]  This provides macro and micronutrient intake and requirements.   GENERAL INFORMATION ON BONE HEALTH :   -Bone Density testing (DXA scan) as recommended.   -Vitamin D supplementation is recommended, unless blood levels are sufficient.   Recommended daily dose of 1000 to 2000 IU total a day, or the dose necessary to achieve a Vitamin D 25-OH blood level of >31 and preferably closer to 40-60 ng/mL.   Vitamin D pills are available over the counter.  -Recommended daily dose of calcium: 1200mg total a day in divided doses.  Calcium is usually sufficient in our regular diet, also available in multivitamins. Patients on certain dietary restrictions or those unable to meet their daily calcium by diety alone, may require calcium supplements. For patient with history of calcium kidney stones, Calcium Citrate would be the recommended supplement. It is recommended to avoid caclium carbonate supplement in this case, as these may increase risk of calcium kidney stones.  When you read a food label and you see calcium reported as DV %, add a zero and this will provide you with the approximate mg value of the calcium content in this food. For example, if a glass of almond milk is labeled as 40% calcium DV, then this contains 400 mg of calcium.  -Regular weight-bearing and muscle-strengthening exercise   -Avoidance of tobacco smoking, excessive alcohol intake and excessive caffeine intake.   -Fall and fracture precautions   -It is recommend to continue regular follow up visits with your dentist every 6 months, and continue with good oral hygiene.    Calcium:   If your diet is sufficient in Calcium rich food, you will not need calcium  supplement.    Calcium Citrate is the preferred calcium if you have had kidney stones.  Daily recommended calcium dose: 600mg twice a day with meals.    Adequate calcium ingestion is essential for maintaining healthy bones. The recommended dose daily intake of calcium varies depending on individual needs but is usually between 1200 and 1500mg daily, preferably around 1200mg a day in divided dose (not all taken at once). This is equivalent to about five 8oz glasses of milk per day.   Many foods are rich in calcium and they include:  - Plant based, non-dairy, calcium rich products, include nuts, almond milk, beans, lentils  - Vegetables and Fruit: bok-courtney, turnips, broccoli, kale, collards,  - Dairy products: milk, cheese, yogurt, ice-cream  - Fish products: canned salmon, sardines and shrimp  - Cereals and nuts: almonds, sesame seeds, fortified cereals and oatmeal  - Other foods: fortified orange-juice, figs, soybeans, other beans and eggs.  If you have a low calcium diet and cannot tolerate calcium-rich foods, many supplements are available today. Your pharmacist can help you choose the one which best suits your needs. A few tips on supplements:  - They should be easy to swallow  - They should dissolve easily in ½ cup of vinegar in < 15 minutes.  - Count the ELEMENTAL calcium mgs. E.g. Calcium 499mg may have only 221mg of elemental Calcium.  - Calcium citrate is the calcium supplement to take if you have had kidney stones and unable to meet your calcium requirements from food/diet alone.  - There is such a variety today that it is best to bring in the bottle to your doctor to show them exactly what you are taking.   Lastly too much calcium can be bad for you. Recent studies show extra supplements may increase your risk of kidney stones or cause high calcium levels in some people. You should discuss how much you should be taking with your doctor before starting them.  Further Information is available from the  following resources:  www.nof.org (National Osteoporosis Foundation)  http://www.osteo.org/osteolinks.asp  National Institutes of Health: 2-377-715-BONE  The Calcium Information [place]: 3-359-205-8997    -Non-Dairy, Plant based Milk, can contain in1 glass up to 450 mg of calcium (300 to 450 mg)  Exampled include Oat Milk, Flax Milk, [place] Milk, Cashew Milk, Soy Milk, Peas Milk  general health and well being    Examples of Food Sources of Calcium from [facility]  Food Milligrams (mg)  per serving Percent DV*   Soymilk, calcium-fortified, 8 ounces 299 30   Orange juice, calcium-fortified, 6 ounces 261 26   Tofu, firm, made with calcium sulfate, ½ cup* 253 25   Tofu, soft, made with calcium sulfate, ½ cup* 138 14   Ready-to-eat cereal, calcium-fortified, 1 cup 100-1,000    Turnip greens, fresh, boiled, ½ cup 99 10   Kale, raw, chopped, 1 cup 100 10   Kale, fresh, cooked, 1 cup 94 9   Chinese cabbage, bok courtney, raw, shredded, 1 cup 74 7   Bread, white, 1 slice 73 7   Tortilla, corn, ready-to-bake/gaviria, one 6” diameter 46 5   Tortilla, flour, ready-to-bake/gaviria, one 6” diameter 32 3   Bread, whole-wheat, 1 slice 30 3   Broccoli, raw, ½ cup 21 2   * DV = Daily Value. DVs were developed by the U.S. Food and Drug Administration to help consumers compare the nutrient contents among products within the context of a total daily diet.   The U.S. Department of Agriculture’s (USDA’s) Nutrient Database Web site lists the nutrient content of many foods and provides comprehensive list of foods containing calcium arranged by nutrient content and by food name.  *Calcium content varies slightly by fat content; the more fat, the less calcium the food contains.  * Calcium content is for tofu processed with a calcium salt. Tofu processed with other salts does not provide significant amounts of calcium.  You could acces this information online at: http://ods.od.nih.gov/factsheets/Calcium-HealthProfessional/    Vitamin D:   Vitamin D3=  cholecalciferol, available over the counter.  Dose recommended 800 to 1000 iu daily with a meal; Certain patients require 1215-2613 iu daily and in patients deficient in Vitamin D, they require higher dosages.  Certain patient requires higher dose, depending on their Vit D blood levels.   Vitamin D is essential for calcium metabolism. It is really a hormone produced mainly in your skin after exposure to sunlight. Vitamin D helps you absorb calcium from your stomach and kidneys and incorporates it into your bones. Studies show approximately 50% of [place] men and women are vitamin D deficient in the winter. Milder cases of vitamin D are usually asymptomatic so the only way to know you have a problem is to have a blood level checked. More severe cases can cause osteomalacia (a.k.a. rickets) which can result in bone pain, weak bones and several abnormal laboratory tests and also weak muscles (a.k.a. myopathy). When this happens, your bones lose a lot of their calcium stores as the body tries to regulate the calcium required by other tissues. Prolonged deficiency can lead to severe bone disorders and fractures.  Unlike calcium, dietary sources of vitamin D are rare, limited to a few fish oils particularly cod-liver oil, other fortified foods and egg yolks. and most are unhealthy. Natural source of vitamin D is through sunshine. This is usually during zoraida seasons, for example a 30 minute exposure to sunshine. Some people are unable to be exposed to the sun due to skin condition. Often supplementation is needed. Many multivitamins contain some vitamin D and vitamin D alone preparations are now available in several forms. The recommended daily intake of vitamin D used to be 400 and 800 international units, however, it is now known that larger amounts are needed, as discussed above. Your doctor can prescribe prescription strength vitamin D for you if necessary, if you have marked deficiency or diseases of the liver  or kidney. Supplementation in patients with severe deficiency can stabilize or improve bone mineral density and in frail elderly persons, may reduce their risk of falling.

## 2023-12-07 NOTE — PROGRESS NOTES
"Subjective   Yumiko Richtre is a 78 y.o. female who presents for Follow-up.  Patient is here to Follow up on test report , mammogram, dexa., reports discussed  Ct chest to revue / discuss at today edmund , no need to repeat further , nodules stable since 2017., not smoking or hx of.  PNM vaccine? pt. Declined 12.07.23        Review of Systems   Constitutional:  Negative for chills, fatigue and unexpected weight change.        Comment   HENT:  Negative for congestion, ear pain and sore throat.    Respiratory:  Negative for cough, chest tightness, shortness of breath and wheezing.    Cardiovascular:  Negative for palpitations and leg swelling.   Gastrointestinal:  Negative for abdominal pain, constipation, diarrhea, nausea and vomiting.   Genitourinary:  Negative for dysuria and urgency.   Musculoskeletal:  Negative for arthralgias and joint swelling.   Skin:  Negative for rash.   Neurological:  Negative for dizziness and weakness.   Hematological:  Negative for adenopathy.   Psychiatric/Behavioral:  Negative for confusion and sleep disturbance.        Objective   Physical Exam  Constitutional:       Appearance: Normal appearance.   HENT:      Head: Normocephalic and atraumatic.   Eyes:      Pupils: Pupils are equal, round, and reactive to light.   Cardiovascular:      Rate and Rhythm: Normal rate and regular rhythm.   Pulmonary:      Effort: Pulmonary effort is normal.      Breath sounds: Normal breath sounds.   Musculoskeletal:         General: Normal range of motion.      Cervical back: Normal range of motion and neck supple.   Skin:     General: Skin is warm.   Neurological:      General: No focal deficit present.      Mental Status: She is alert and oriented to person, place, and time.   Psychiatric:         Mood and Affect: Mood normal.         Behavior: Behavior normal.       /64 (BP Location: Right arm, Patient Position: Sitting)   Pulse 78   Temp 36.8 °C (98.2 °F)   Resp 16   Ht 1.651 m (5' 5\")   Wt 58.4 " kg (128 lb 12.8 oz)   SpO2 97%   BMI 21.43 kg/m²       Assessment/Plan   1. Health care maintenance  Fu for awv  - Follow Up In [facility] - PCP    2. Age-related osteoporosis without current pathological fracture  Take ca +vit d 1200 mg qd    3. Vitamin B12 deficiency  Not on vit b12 now, will check level    4. Leg pain, anterior, right  Probably muscular/  - lidocaine (Lidoderm) 5 % patch; Place 1 patch over 12 hours on the skin once daily. Apply to painful area 12 hours per day, remove for 12 hours.  Dispense: 30 patch; Refill: 3

## 2023-12-08 LAB — HCV AB SER QL: NONREACTIVE

## 2023-12-21 ENCOUNTER — HOSPITAL ENCOUNTER (OUTPATIENT)
Dept: RADIOLOGY | Facility: HOSPITAL | Age: 78
Discharge: HOME | End: 2023-12-21
Payer: MEDICARE

## 2023-12-21 DIAGNOSIS — E78.5 DYSLIPIDEMIA: ICD-10-CM

## 2023-12-21 PROCEDURE — 75571 CT HRT W/O DYE W/CA TEST: CPT

## 2024-01-08 ENCOUNTER — TELEPHONE (OUTPATIENT)
Dept: PRIMARY CARE | Facility: CLINIC | Age: 79
End: 2024-01-08
Payer: MEDICARE

## 2024-01-08 NOTE — TELEPHONE ENCOUNTER
Patient states she had the mammogram and DEXA in 12.2023 at [facility], she received a letter from mammography but not sure what is says, she will come next Monday to bring in a copy to revue, but looks like we have the  reports in computer,please advise. Thank you !

## 2024-02-12 ENCOUNTER — OFFICE VISIT (OUTPATIENT)
Dept: PRIMARY CARE | Facility: CLINIC | Age: 79
End: 2024-02-12
Payer: MEDICARE

## 2024-02-12 VITALS
HEART RATE: 81 BPM | TEMPERATURE: 97.5 F | BODY MASS INDEX: 21.49 KG/M2 | RESPIRATION RATE: 18 BRPM | SYSTOLIC BLOOD PRESSURE: 110 MMHG | HEIGHT: 65 IN | DIASTOLIC BLOOD PRESSURE: 60 MMHG | WEIGHT: 129 LBS | OXYGEN SATURATION: 95 %

## 2024-02-12 DIAGNOSIS — D70.9 NEUTROPENIA, UNSPECIFIED TYPE (CMS-HCC): ICD-10-CM

## 2024-02-12 DIAGNOSIS — E53.8 VITAMIN B12 DEFICIENCY: ICD-10-CM

## 2024-02-12 DIAGNOSIS — E03.8 OTHER SPECIFIED HYPOTHYROIDISM: ICD-10-CM

## 2024-02-12 DIAGNOSIS — R05.1 ACUTE COUGH: Primary | ICD-10-CM

## 2024-02-12 DIAGNOSIS — E55.9 VITAMIN D DEFICIENCY: ICD-10-CM

## 2024-02-12 PROCEDURE — 1157F ADVNC CARE PLAN IN RCRD: CPT | Performed by: INTERNAL MEDICINE

## 2024-02-12 PROCEDURE — 99213 OFFICE O/P EST LOW 20 MIN: CPT | Performed by: INTERNAL MEDICINE

## 2024-02-12 PROCEDURE — 1159F MED LIST DOCD IN RCRD: CPT | Performed by: INTERNAL MEDICINE

## 2024-02-12 PROCEDURE — 1160F RVW MEDS BY RX/DR IN RCRD: CPT | Performed by: INTERNAL MEDICINE

## 2024-02-12 RX ORDER — UBIDECARENONE 75 MG
500 CAPSULE ORAL DAILY
Qty: 45 TABLET | Refills: 3 | Status: SHIPPED | OUTPATIENT
Start: 2024-02-12 | End: 2024-03-04 | Stop reason: WASHOUT

## 2024-02-12 RX ORDER — FLUTICASONE PROPIONATE 50 MCG
1 SPRAY, SUSPENSION (ML) NASAL DAILY
Qty: 16 G | Refills: 5 | Status: SHIPPED | OUTPATIENT
Start: 2024-02-12 | End: 2025-02-11

## 2024-02-12 ASSESSMENT — ENCOUNTER SYMPTOMS
DIZZINESS: 0
SHORTNESS OF BREATH: 0
WHEEZING: 0
COUGH: 1
RHINORRHEA: 1
HEADACHES: 0

## 2024-02-12 NOTE — PROGRESS NOTES
"Cough  Subjective   Yumiko Richter is a 78 y.o. female who presents for Cough.  Cough  dry then wet clear, runny nose , clear , no fever, no chills, no OTC meds ,had sore throat ,but not anymore, took lemon with honey, no SOB ,patient believes might be from GERD , has the symptoms for about 1 week, sneezing too., had some sore throat     Cough  This is a new problem. The current episode started in the past 7 days. The problem has been unchanged. The problem occurs constantly. The cough is Non-productive. Associated symptoms include rhinorrhea. Pertinent negatives include no headaches, shortness of breath or wheezing.     Review of Systems   HENT:  Positive for rhinorrhea.    Respiratory:  Positive for cough. Negative for shortness of breath and wheezing.    Neurological:  Negative for dizziness and headaches.       Objective   Physical Exam  /60 (BP Location: Left arm, Patient Position: Lying)   Pulse 81   Temp 36.4 °C (97.5 °F)   Resp 18   Ht 1.651 m (5' 5\")   Wt 58.5 kg (129 lb)   SpO2 95%   BMI 21.47 kg/m²       Assessment/Plan   Problem List Items Addressed This Visit       Hypothyroidism    Relevant Orders    CBC and Auto Differential    TSH with reflex to Free T4 if abnormal    Vitamin B12    Magnesium    Lipid Panel    Comprehensive Metabolic Panel    Vitamin D 25-Hydroxy,Total (for eval of Vitamin D levels)    Leucopenia    Vitamin B12 deficiency    Relevant Medications    cyanocobalamin (Vitamin B-12) 500 mcg tablet    Other Relevant Orders    Vitamin B12    Vitamin D deficiency    Relevant Orders    Vitamin D 25-Hydroxy,Total (for eval of Vitamin D levels)    Acute cough - Primary     Sec to viral sd? Flonase  prn         Relevant Medications    fluticasone (Flonase) 50 mcg/actuation nasal spray       "

## 2024-02-29 ENCOUNTER — LAB (OUTPATIENT)
Dept: LAB | Facility: LAB | Age: 79
End: 2024-02-29
Payer: MEDICARE

## 2024-02-29 DIAGNOSIS — E53.8 VITAMIN B12 DEFICIENCY: ICD-10-CM

## 2024-02-29 DIAGNOSIS — E03.8 OTHER SPECIFIED HYPOTHYROIDISM: ICD-10-CM

## 2024-02-29 DIAGNOSIS — E55.9 VITAMIN D DEFICIENCY: ICD-10-CM

## 2024-02-29 LAB
25(OH)D3 SERPL-MCNC: 32 NG/ML (ref 30–100)
ALBUMIN SERPL BCP-MCNC: 4.3 G/DL (ref 3.4–5)
ALP SERPL-CCNC: 66 U/L (ref 33–136)
ALT SERPL W P-5'-P-CCNC: 28 U/L (ref 7–45)
ANION GAP SERPL CALC-SCNC: 13 MMOL/L (ref 10–20)
AST SERPL W P-5'-P-CCNC: 34 U/L (ref 9–39)
BASOPHILS # BLD AUTO: 0.04 X10*3/UL (ref 0–0.1)
BASOPHILS NFR BLD AUTO: 0.8 %
BILIRUB SERPL-MCNC: 0.6 MG/DL (ref 0–1.2)
BUN SERPL-MCNC: 17 MG/DL (ref 6–23)
CALCIUM SERPL-MCNC: 9.5 MG/DL (ref 8.6–10.3)
CHLORIDE SERPL-SCNC: 104 MMOL/L (ref 98–107)
CHOLEST SERPL-MCNC: 157 MG/DL (ref 0–199)
CHOLESTEROL/HDL RATIO: 2.7
CO2 SERPL-SCNC: 24 MMOL/L (ref 21–32)
CREAT SERPL-MCNC: 0.87 MG/DL (ref 0.5–1.05)
EGFRCR SERPLBLD CKD-EPI 2021: 68 ML/MIN/1.73M*2
EOSINOPHIL # BLD AUTO: 0.19 X10*3/UL (ref 0–0.4)
EOSINOPHIL NFR BLD AUTO: 4 %
ERYTHROCYTE [DISTWIDTH] IN BLOOD BY AUTOMATED COUNT: 12.3 % (ref 11.5–14.5)
GLUCOSE SERPL-MCNC: 83 MG/DL (ref 74–99)
HCT VFR BLD AUTO: 35.7 % (ref 36–46)
HDLC SERPL-MCNC: 57.2 MG/DL
HGB BLD-MCNC: 12.3 G/DL (ref 12–16)
IMM GRANULOCYTES # BLD AUTO: 0.01 X10*3/UL (ref 0–0.5)
IMM GRANULOCYTES NFR BLD AUTO: 0.2 % (ref 0–0.9)
LDLC SERPL CALC-MCNC: 85 MG/DL
LYMPHOCYTES # BLD AUTO: 1.27 X10*3/UL (ref 0.8–3)
LYMPHOCYTES NFR BLD AUTO: 26.6 %
MAGNESIUM SERPL-MCNC: 2.17 MG/DL (ref 1.6–2.4)
MCH RBC QN AUTO: 31.8 PG (ref 26–34)
MCHC RBC AUTO-ENTMCNC: 34.5 G/DL (ref 32–36)
MCV RBC AUTO: 92 FL (ref 80–100)
MONOCYTES # BLD AUTO: 0.7 X10*3/UL (ref 0.05–0.8)
MONOCYTES NFR BLD AUTO: 14.6 %
NEUTROPHILS # BLD AUTO: 2.57 X10*3/UL (ref 1.6–5.5)
NEUTROPHILS NFR BLD AUTO: 53.8 %
NON HDL CHOLESTEROL: 100 MG/DL (ref 0–149)
NRBC BLD-RTO: 0 /100 WBCS (ref 0–0)
PLATELET # BLD AUTO: 213 X10*3/UL (ref 150–450)
POTASSIUM SERPL-SCNC: 4.5 MMOL/L (ref 3.5–5.3)
PROT SERPL-MCNC: 6.6 G/DL (ref 6.4–8.2)
RBC # BLD AUTO: 3.87 X10*6/UL (ref 4–5.2)
SODIUM SERPL-SCNC: 136 MMOL/L (ref 136–145)
TRIGL SERPL-MCNC: 74 MG/DL (ref 0–149)
TSH SERPL-ACNC: 0.51 MIU/L (ref 0.44–3.98)
VIT B12 SERPL-MCNC: 761 PG/ML (ref 211–911)
VLDL: 15 MG/DL (ref 0–40)
WBC # BLD AUTO: 4.8 X10*3/UL (ref 4.4–11.3)

## 2024-02-29 PROCEDURE — 36415 COLL VENOUS BLD VENIPUNCTURE: CPT

## 2024-02-29 PROCEDURE — 83735 ASSAY OF MAGNESIUM: CPT

## 2024-02-29 PROCEDURE — 80053 COMPREHEN METABOLIC PANEL: CPT

## 2024-02-29 PROCEDURE — 82306 VITAMIN D 25 HYDROXY: CPT

## 2024-02-29 PROCEDURE — 80061 LIPID PANEL: CPT

## 2024-02-29 PROCEDURE — 85025 COMPLETE CBC W/AUTO DIFF WBC: CPT

## 2024-02-29 PROCEDURE — 82607 VITAMIN B-12: CPT

## 2024-02-29 PROCEDURE — 84443 ASSAY THYROID STIM HORMONE: CPT

## 2024-03-04 ENCOUNTER — OFFICE VISIT (OUTPATIENT)
Dept: PRIMARY CARE | Facility: CLINIC | Age: 79
End: 2024-03-04
Payer: MEDICARE

## 2024-03-04 VITALS
HEART RATE: 80 BPM | OXYGEN SATURATION: 97 % | RESPIRATION RATE: 16 BRPM | SYSTOLIC BLOOD PRESSURE: 110 MMHG | DIASTOLIC BLOOD PRESSURE: 62 MMHG | TEMPERATURE: 97.5 F | BODY MASS INDEX: 21.43 KG/M2 | WEIGHT: 128.6 LBS | HEIGHT: 65 IN

## 2024-03-04 DIAGNOSIS — E55.9 VITAMIN D DEFICIENCY: ICD-10-CM

## 2024-03-04 DIAGNOSIS — M25.561 CHRONIC PAIN OF BOTH KNEES: ICD-10-CM

## 2024-03-04 DIAGNOSIS — E78.5 DYSLIPIDEMIA: ICD-10-CM

## 2024-03-04 DIAGNOSIS — Z00.00 MEDICARE ANNUAL WELLNESS VISIT, SUBSEQUENT: Primary | ICD-10-CM

## 2024-03-04 DIAGNOSIS — E03.8 OTHER SPECIFIED HYPOTHYROIDISM: ICD-10-CM

## 2024-03-04 DIAGNOSIS — Z12.31 ENCOUNTER FOR SCREENING MAMMOGRAM FOR MALIGNANT NEOPLASM OF BREAST: ICD-10-CM

## 2024-03-04 DIAGNOSIS — G89.29 CHRONIC PAIN OF BOTH KNEES: ICD-10-CM

## 2024-03-04 DIAGNOSIS — M25.562 CHRONIC PAIN OF BOTH KNEES: ICD-10-CM

## 2024-03-04 DIAGNOSIS — E53.8 VITAMIN B12 DEFICIENCY: ICD-10-CM

## 2024-03-04 PROBLEM — R05.1 ACUTE COUGH: Status: RESOLVED | Noted: 2023-09-11 | Resolved: 2024-03-04

## 2024-03-04 PROBLEM — J04.0: Status: ACTIVE | Noted: 2024-03-04

## 2024-03-04 PROCEDURE — 1159F MED LIST DOCD IN RCRD: CPT | Performed by: INTERNAL MEDICINE

## 2024-03-04 PROCEDURE — 1170F FXNL STATUS ASSESSED: CPT | Performed by: INTERNAL MEDICINE

## 2024-03-04 PROCEDURE — 1157F ADVNC CARE PLAN IN RCRD: CPT | Performed by: INTERNAL MEDICINE

## 2024-03-04 PROCEDURE — G0439 PPPS, SUBSEQ VISIT: HCPCS | Performed by: INTERNAL MEDICINE

## 2024-03-04 PROCEDURE — 99497 ADVNCD CARE PLAN 30 MIN: CPT | Performed by: INTERNAL MEDICINE

## 2024-03-04 PROCEDURE — 1160F RVW MEDS BY RX/DR IN RCRD: CPT | Performed by: INTERNAL MEDICINE

## 2024-03-04 PROCEDURE — G0446 INTENS BEHAVE THER CARDIO DX: HCPCS | Performed by: INTERNAL MEDICINE

## 2024-03-04 PROCEDURE — 1123F ACP DISCUSS/DSCN MKR DOCD: CPT | Performed by: INTERNAL MEDICINE

## 2024-03-04 PROCEDURE — G0444 DEPRESSION SCREEN ANNUAL: HCPCS | Performed by: INTERNAL MEDICINE

## 2024-03-04 PROCEDURE — 1158F ADVNC CARE PLAN TLK DOCD: CPT | Performed by: INTERNAL MEDICINE

## 2024-03-04 ASSESSMENT — ACTIVITIES OF DAILY LIVING (ADL)
MANAGING_FINANCES: INDEPENDENT
DRESSING: INDEPENDENT
TAKING_MEDICATION: INDEPENDENT
BATHING: INDEPENDENT
GROCERY_SHOPPING: INDEPENDENT
DOING_HOUSEWORK: INDEPENDENT

## 2024-03-04 ASSESSMENT — ENCOUNTER SYMPTOMS
BACK PAIN: 1
NECK PAIN: 1
FATIGUE: 1

## 2024-03-04 NOTE — PROGRESS NOTES
Subjective   Reason for Visit: Yumiko Richter is an 78 y.o. female here for a Medicare Wellness visit.     Past Medical, Surgical, and Family History reviewed and updated in chart.    Reviewed all medications by prescribing practitioner or clinical pharmacist (such as prescriptions, OTCs, herbal therapies and supplements) and documented in the medical record.    AWV  CT chest done 12.2023 - no need to repeat in future   Labs 12.2023, 2.2024  Mammo and dexa- 1.2024  Ct cardiac scoring - 12.2023  PNM vacc decline   Smoking - never   Ct chest 2023, no more ct, small nodules are stable.        Patient Care Team:  Syed Trinh MD as PCP - General  Syed Trinh MD as PCP - [employer] PCP     Review of Systems   Constitutional:  Positive for fatigue.        In AM    Musculoskeletal:  Positive for back pain and neck pain.   All other systems reviewed and are negative.  === 12/21/23 ===    CT CARDIAC SCORING WO IV CONTRAST    - Impression -  1. Coronary artery calcium score of 0.62.  2. Stable subcentimeter parenchymal airspace opacities.  3. Residual ground-glass opacities may reflect prior  infectious/inflammatory changes/bronchiolitis.*.    *Coronary Artery  Agatston score    Score  risk  Very low 1-99  Mildly increased 100-299  Moderately increased >300  Moderate to severely increased >800    Graham et al. JCCT 2016 (http://dx.doi.org/10.1016/j.jcct.2016.11.003)    [place] 10-Year CHD Risk with Coronary Artery Calcification can be  calcuate using link below  [email or web address]  Neil hinds al. JACC 2015 (http://dx.doi.org/10.1016/j.j  acc.2015.08.035)    Signed by: Jesse Lombardo 12/22/2023 8:17 AM  Dictation workstation:   [id number]  RESULT:  x ray knee    No acute fracture or dislocation.  Severe patellofemoral compartment   joint space narrowing with marginal osteophytes.  The medial and lateral   compartments appear grossly preserved with small  "marginal osteophytes.    Scattered small, subcentimeter calcifications along the anterior and   posterior joint likely small joint bodies.  No significant joint effusion.     Patellofemoral compartment predominant degenerative changes of the right   knee.     IMPRESSION   IMPRESSION:     No acute osseous abnormality.     Left knee osteoarthritis, advanced in the patellofemoral compartment.       Objective   Vitals:  /62 (BP Location: Left arm, Patient Position: Sitting)   Pulse 80   Temp 36.4 °C (97.5 °F)   Resp 16   Ht 1.651 m (5' 5\")   Wt 58.3 kg (128 lb 9.6 oz)   SpO2 97%   BMI 21.40 kg/m²       Physical Exam    Assessment/Plan   Problem List Items Addressed This Visit       Dyslipidemia    Current Assessment & Plan     I have discussed the cardiovascular risk and behavioral modification, nutrition, exercise and elimination of habits contributing to risk. We agreed to a plan how to reduce the risk.  CV risk estimate calculates 18.9%  Calcium score is 0  Refusing statins           Hypothyroidism    Vitamin B12 deficiency    Current Assessment & Plan     Resolved , off vit b12         Vitamin D deficiency    Medicare annual wellness visit, subsequent - Primary    Chronic pain of both knees    Current Assessment & Plan     Has oa, spurs,           Other Visit Diagnoses       Encounter for screening mammogram for malignant neoplasm of breast                   "

## 2024-03-04 NOTE — ASSESSMENT & PLAN NOTE
I have discussed the cardiovascular risk and behavioral modification, nutrition, exercise and elimination of habits contributing to risk. We agreed to a plan how to reduce the risk.  CV risk estimate calculates 18.9%  Calcium score is 0  Refusing statins

## 2024-03-04 NOTE — PATIENT INSTRUCTIONS
Was nice seeing you today.  Continue same medication.  Have lab work done before next appointment if labs were ordered today.  Fu in 3 month.  Call/ contact our office with any concerns.    If you have labs or test done and you can't see the report in your chart or you didn't here from us in 2 weeks after test/labs done , please, call our office for reports.  Please , do not assume that they were normal.  Discussed living will and power of  for health care. All question answered and information given to patient.  Will bring in a copy of the Will for health care.

## 2024-03-20 ENCOUNTER — OFFICE VISIT (OUTPATIENT)
Dept: PRIMARY CARE | Facility: CLINIC | Age: 79
End: 2024-03-20
Payer: MEDICARE

## 2024-03-20 VITALS
DIASTOLIC BLOOD PRESSURE: 62 MMHG | RESPIRATION RATE: 16 BRPM | HEIGHT: 65 IN | BODY MASS INDEX: 21.23 KG/M2 | HEART RATE: 69 BPM | OXYGEN SATURATION: 96 % | TEMPERATURE: 97.9 F | SYSTOLIC BLOOD PRESSURE: 110 MMHG | WEIGHT: 127.4 LBS

## 2024-03-20 DIAGNOSIS — B34.9 VIRAL SYNDROME: Primary | ICD-10-CM

## 2024-03-20 DIAGNOSIS — J04.0 ACUTE SIMPLE LARYNGITIS: ICD-10-CM

## 2024-03-20 PROCEDURE — 1123F ACP DISCUSS/DSCN MKR DOCD: CPT | Performed by: INTERNAL MEDICINE

## 2024-03-20 PROCEDURE — 99213 OFFICE O/P EST LOW 20 MIN: CPT | Performed by: INTERNAL MEDICINE

## 2024-03-20 PROCEDURE — 1157F ADVNC CARE PLAN IN RCRD: CPT | Performed by: INTERNAL MEDICINE

## 2024-03-20 PROCEDURE — 1159F MED LIST DOCD IN RCRD: CPT | Performed by: INTERNAL MEDICINE

## 2024-03-20 PROCEDURE — 1160F RVW MEDS BY RX/DR IN RCRD: CPT | Performed by: INTERNAL MEDICINE

## 2024-03-20 RX ORDER — BENZONATATE 100 MG/1
100 CAPSULE ORAL 3 TIMES DAILY PRN
Qty: 42 CAPSULE | Refills: 0 | Status: SHIPPED | OUTPATIENT
Start: 2024-03-20 | End: 2024-04-19

## 2024-03-20 ASSESSMENT — ENCOUNTER SYMPTOMS
UNEXPECTED WEIGHT CHANGE: 0
CHILLS: 0
CONSTIPATION: 0
NAUSEA: 0
DIARRHEA: 0
SORE THROAT: 0
VOMITING: 0
CHEST TIGHTNESS: 0
CONFUSION: 0
PALPITATIONS: 0
ARTHRALGIAS: 0
JOINT SWELLING: 0
FATIGUE: 0
DIZZINESS: 0
ADENOPATHY: 0
WEAKNESS: 0
DYSURIA: 0
SLEEP DISTURBANCE: 0
SHORTNESS OF BREATH: 0
WHEEZING: 0
COUGH: 0
ABDOMINAL PAIN: 0

## 2024-03-20 ASSESSMENT — PATIENT HEALTH QUESTIONNAIRE - PHQ9
SUM OF ALL RESPONSES TO PHQ9 QUESTIONS 1 AND 2: 0
1. LITTLE INTEREST OR PLEASURE IN DOING THINGS: NOT AT ALL
2. FEELING DOWN, DEPRESSED OR HOPELESS: NOT AT ALL

## 2024-03-20 NOTE — PATIENT INSTRUCTIONS
This is very likely a viral illness and will resolve on its own.  For the time being, symptomatic control will help you stay functional and feel better during the day  Be sure to get at least 8 hours of sleep a night to promote recovery, and to drink plenty of fluids every day  Appropriate testing for upper respiratory pathogens has been ordered  Around 98% of all upper respiratory infections in your age group are caused by viruses. Antibiotics are unlikely to benefit while only giving you possible side effects including diarrhea and increasing the risk of antibiotic resistant infections and C. difficile. Antibiotics do not fight viral infections.   The best treatment is the tincture of time and allowing your body to fight the infection on its own.    Appropriate testing for upper respiratory pathogens may have been ordered, and pending results, treatment will be altered to address specific pathogens if necessary.    You could use these medications, please use them as follows:  Benzonatate 2-3 times during the day for coughing.   Zyrtec/Claritin once a day before bed for rhinorrhea and sinus pressure/inflammation/allergic symptoms. This will help with post-nasal drip causing sore throat.   Fluticasone nasal spray for sinus pressure and allergic symptoms, this takes a few days to start working, however.  Azelastine nasal spray in the intermediate will help with rhinorrhea and sinus pain and pressure for the short term  Fu in 10 days  Motrin /Tylenol prn for pain and fever.

## 2024-03-20 NOTE — PROGRESS NOTES
Subjective   Yumiko Richter is a 78 y.o. female who presents for Cough (Cough x 2 weeks).  Cough  dry and wet x 2 weeks  daily  more  at night,no  fever, no chills, no headaches,no NVD,  did  do [place]  home remedies, no other symptoms, had little runny nose 1 week ago for 1 day only ,fatigue  more lately     in the room with patient today      Review of Systems   Constitutional:  Negative for chills, fatigue and unexpected weight change.        Comment   HENT:  Negative for congestion, ear pain and sore throat.    Respiratory:  Negative for cough, chest tightness, shortness of breath and wheezing.    Cardiovascular:  Negative for palpitations and leg swelling.   Gastrointestinal:  Negative for abdominal pain, constipation, diarrhea, nausea and vomiting.   Genitourinary:  Negative for dysuria and urgency.   Musculoskeletal:  Negative for arthralgias and joint swelling.   Skin:  Negative for rash.   Neurological:  Negative for dizziness and weakness.   Hematological:  Negative for adenopathy.   Psychiatric/Behavioral:  Negative for confusion and sleep disturbance.        Objective   Physical Exam  Constitutional:       Appearance: Normal appearance.   HENT:      Head: Normocephalic and atraumatic.   Eyes:      Pupils: Pupils are equal, round, and reactive to light.   Cardiovascular:      Rate and Rhythm: Normal rate and regular rhythm.   Pulmonary:      Effort: Pulmonary effort is normal.      Breath sounds: Normal breath sounds.   Musculoskeletal:         General: Normal range of motion.      Cervical back: Normal range of motion and neck supple.   Skin:     General: Skin is warm.   Neurological:      General: No focal deficit present.      Mental Status: She is alert and oriented to person, place, and time.   Psychiatric:         Mood and Affect: Mood normal.         Behavior: Behavior normal.       /62 (BP Location: Left arm, Patient Position: Sitting)   Pulse 69   Temp 36.6 °C (97.9 °F)   Resp 16   Ht  "1.651 m (5' 5\")   Wt 57.8 kg (127 lb 6.4 oz)   SpO2 96%   BMI 21.20 kg/m²     === 12/21/23 ===    CT CARDIAC SCORING WO IV CONTRAST    - Impression -  1. Coronary artery calcium score of 0.62.  2. Stable subcentimeter parenchymal airspace opacities.  3. Residual ground-glass opacities may reflect prior  infectious/inflammatory changes/bronchiolitis.*.    *Coronary Artery  Agatston score    Score  risk  Very low 1-99  Mildly increased 100-299  Moderately increased >300  Moderate to severely increased >800    Graham et al. JCCT 2016 (http://dx.doi.org/10.1016/j.jcct.2016.11.003)    [place] 10-Year CHD Risk with Coronary Artery Calcification can be  calcuate using link below  [email or web address]  Neil hinds al. JACC 2015 (http://dx.doi.org/10.1016/j.j  acc.2015.08.035)    Signed by: Jesse Lombardo 12/22/2023 8:17 AM  Dictation workstation:   [id number]  Impression ct chest 2023   Stable bilateral subcentimeter pulmonary nodules compared to studies  dating back to 03/15/2017. Many of these nodules are stable compared  to older study from 03/18/2014.  No new or enlarging pulmonary nodule.  Fleischner guidelines: Multiple non-calcified pulmonary nodules  measuring less than 6 mm, likely benign. No further follow-up is  required, however, if the patient has high risk factors for primary  lung malignancy, follow-up noncontrast CT scan chest in 12 months may  be obtained. (Ron López et al., Guidelines for management of     Assessment/Plan   Problem List Items Addressed This Visit       Acute simple laryngitis    Viral syndrome - Primary    Relevant Medications    benzonatate (Tessalon) 100 mg capsule       "

## 2024-04-21 ENCOUNTER — HOSPITAL ENCOUNTER (EMERGENCY)
Facility: HOSPITAL | Age: 79
Discharge: HOME | End: 2024-04-21
Attending: EMERGENCY MEDICINE
Payer: MEDICARE

## 2024-04-21 VITALS
HEIGHT: 63 IN | HEART RATE: 81 BPM | DIASTOLIC BLOOD PRESSURE: 78 MMHG | RESPIRATION RATE: 16 BRPM | OXYGEN SATURATION: 99 % | WEIGHT: 125 LBS | BODY MASS INDEX: 22.15 KG/M2 | SYSTOLIC BLOOD PRESSURE: 144 MMHG | TEMPERATURE: 97.5 F

## 2024-04-21 DIAGNOSIS — R10.11 RIGHT UPPER QUADRANT ABDOMINAL PAIN: Primary | ICD-10-CM

## 2024-04-21 LAB
ALBUMIN SERPL BCP-MCNC: 4.2 G/DL (ref 3.4–5)
ALP SERPL-CCNC: 59 U/L (ref 33–136)
ALT SERPL W P-5'-P-CCNC: 15 U/L (ref 7–45)
ANION GAP SERPL CALC-SCNC: 10 MMOL/L (ref 10–20)
APPEARANCE UR: CLEAR
AST SERPL W P-5'-P-CCNC: 21 U/L (ref 9–39)
BASOPHILS # BLD AUTO: 0.02 X10*3/UL (ref 0–0.1)
BASOPHILS NFR BLD AUTO: 0.4 %
BILIRUB SERPL-MCNC: 0.4 MG/DL (ref 0–1.2)
BILIRUB UR STRIP.AUTO-MCNC: NEGATIVE MG/DL
BUN SERPL-MCNC: 17 MG/DL (ref 6–23)
CALCIUM SERPL-MCNC: 8.8 MG/DL (ref 8.6–10.3)
CHLORIDE SERPL-SCNC: 99 MMOL/L (ref 98–107)
CO2 SERPL-SCNC: 30 MMOL/L (ref 21–32)
COLOR UR: ABNORMAL
CREAT SERPL-MCNC: 0.7 MG/DL (ref 0.5–1.05)
EGFRCR SERPLBLD CKD-EPI 2021: 88 ML/MIN/1.73M*2
EOSINOPHIL # BLD AUTO: 0.17 X10*3/UL (ref 0–0.4)
EOSINOPHIL NFR BLD AUTO: 3.5 %
ERYTHROCYTE [DISTWIDTH] IN BLOOD BY AUTOMATED COUNT: 12.2 % (ref 11.5–14.5)
GLUCOSE SERPL-MCNC: 96 MG/DL (ref 74–99)
GLUCOSE UR STRIP.AUTO-MCNC: NEGATIVE MG/DL
HCT VFR BLD AUTO: 34.1 % (ref 36–46)
HGB BLD-MCNC: 11.7 G/DL (ref 12–16)
IMM GRANULOCYTES # BLD AUTO: 0.01 X10*3/UL (ref 0–0.5)
IMM GRANULOCYTES NFR BLD AUTO: 0.2 % (ref 0–0.9)
KETONES UR STRIP.AUTO-MCNC: NEGATIVE MG/DL
LACTATE SERPL-SCNC: 0.5 MMOL/L (ref 0.4–2)
LEUKOCYTE ESTERASE UR QL STRIP.AUTO: NEGATIVE
LIPASE SERPL-CCNC: 37 U/L (ref 9–82)
LYMPHOCYTES # BLD AUTO: 1.28 X10*3/UL (ref 0.8–3)
LYMPHOCYTES NFR BLD AUTO: 26.1 %
MCH RBC QN AUTO: 30.9 PG (ref 26–34)
MCHC RBC AUTO-ENTMCNC: 34.3 G/DL (ref 32–36)
MCV RBC AUTO: 90 FL (ref 80–100)
MONOCYTES # BLD AUTO: 0.57 X10*3/UL (ref 0.05–0.8)
MONOCYTES NFR BLD AUTO: 11.6 %
NEUTROPHILS # BLD AUTO: 2.86 X10*3/UL (ref 1.6–5.5)
NEUTROPHILS NFR BLD AUTO: 58.2 %
NITRITE UR QL STRIP.AUTO: NEGATIVE
NRBC BLD-RTO: 0 /100 WBCS (ref 0–0)
PH UR STRIP.AUTO: 9 [PH]
PLATELET # BLD AUTO: 179 X10*3/UL (ref 150–450)
POTASSIUM SERPL-SCNC: 4 MMOL/L (ref 3.5–5.3)
PROT SERPL-MCNC: 6.3 G/DL (ref 6.4–8.2)
PROT UR STRIP.AUTO-MCNC: NEGATIVE MG/DL
RBC # BLD AUTO: 3.79 X10*6/UL (ref 4–5.2)
RBC # UR STRIP.AUTO: NEGATIVE /UL
SODIUM SERPL-SCNC: 135 MMOL/L (ref 136–145)
SP GR UR STRIP.AUTO: 1
UROBILINOGEN UR STRIP.AUTO-MCNC: <2 MG/DL
WBC # BLD AUTO: 4.9 X10*3/UL (ref 4.4–11.3)

## 2024-04-21 PROCEDURE — 81003 URINALYSIS AUTO W/O SCOPE: CPT | Performed by: EMERGENCY MEDICINE

## 2024-04-21 PROCEDURE — 80053 COMPREHEN METABOLIC PANEL: CPT | Performed by: EMERGENCY MEDICINE

## 2024-04-21 PROCEDURE — 99283 EMERGENCY DEPT VISIT LOW MDM: CPT

## 2024-04-21 PROCEDURE — 83605 ASSAY OF LACTIC ACID: CPT | Performed by: EMERGENCY MEDICINE

## 2024-04-21 PROCEDURE — 99284 EMERGENCY DEPT VISIT MOD MDM: CPT | Performed by: EMERGENCY MEDICINE

## 2024-04-21 PROCEDURE — 85025 COMPLETE CBC W/AUTO DIFF WBC: CPT | Performed by: EMERGENCY MEDICINE

## 2024-04-21 PROCEDURE — 36415 COLL VENOUS BLD VENIPUNCTURE: CPT | Performed by: EMERGENCY MEDICINE

## 2024-04-21 PROCEDURE — 83690 ASSAY OF LIPASE: CPT | Performed by: EMERGENCY MEDICINE

## 2024-04-21 ASSESSMENT — COLUMBIA-SUICIDE SEVERITY RATING SCALE - C-SSRS
2. HAVE YOU ACTUALLY HAD ANY THOUGHTS OF KILLING YOURSELF?: NO
1. IN THE PAST MONTH, HAVE YOU WISHED YOU WERE DEAD OR WISHED YOU COULD GO TO SLEEP AND NOT WAKE UP?: NO
6. HAVE YOU EVER DONE ANYTHING, STARTED TO DO ANYTHING, OR PREPARED TO DO ANYTHING TO END YOUR LIFE?: NO

## 2024-04-21 ASSESSMENT — PAIN SCALES - GENERAL
PAINLEVEL_OUTOF10: 7
PAINLEVEL_OUTOF10: 0 - NO PAIN

## 2024-04-21 ASSESSMENT — LIFESTYLE VARIABLES
HAVE YOU EVER FELT YOU SHOULD CUT DOWN ON YOUR DRINKING: NO
EVER FELT BAD OR GUILTY ABOUT YOUR DRINKING: NO
HAVE PEOPLE ANNOYED YOU BY CRITICIZING YOUR DRINKING: NO
EVER HAD A DRINK FIRST THING IN THE MORNING TO STEADY YOUR NERVES TO GET RID OF A HANGOVER: NO
TOTAL SCORE: 0

## 2024-04-21 ASSESSMENT — PAIN DESCRIPTION - PAIN TYPE: TYPE: ACUTE PAIN

## 2024-04-21 ASSESSMENT — PAIN - FUNCTIONAL ASSESSMENT: PAIN_FUNCTIONAL_ASSESSMENT: 0-10

## 2024-04-21 ASSESSMENT — PAIN DESCRIPTION - LOCATION: LOCATION: ABDOMEN

## 2024-04-21 ASSESSMENT — PAIN DESCRIPTION - ORIENTATION: ORIENTATION: RIGHT

## 2024-04-21 NOTE — ED PROVIDER NOTES
EMERGENCY DEPARTMENT ENCOUNTER      Pt Name: Yumiko Richter  MRN: [id number]  Birthdate 1945  Date of evaluation: 4/21/2024  Provider: Aquilino Julian MD    CHIEF COMPLAINT       Chief Complaint   Patient presents with    Abdominal Pain     Right sided. Worse after eating. Started today         HISTORY OF PRESENT ILLNESS    HPI  Patient is a 78-year-old female with history of hypothyroidism presenting with abdominal pain.  Acute onset early this morning approximately 30 minutes after eating breakfast.  Pain was crampy, 6/10, epigastric/right upper quadrant, nonradiating, without additional consistent alleviating or exacerbating factors.  It has since resolved.  She denies fever, chills, chest pain, shortness of breath, cough, nausea, vomiting, diarrhea, constipation, bloody stools, dysuria, hematuria.  She has already had her gallbladder and appendix removed.    Nursing Notes were reviewed.    PAST MEDICAL HISTORY     Past Medical History:   Diagnosis Date    Personal history of other endocrine, nutritional and metabolic disease 10/09/2019    History of hypothyroidism    Personal history of other infectious and parasitic diseases 11/03/2017    History of herpes zoster         SURGICAL HISTORY       Past Surgical History:   Procedure Laterality Date    OTHER SURGICAL HISTORY  03/24/2021    Cataract surgery         CURRENT MEDICATIONS       Discharge Medication List as of 4/21/2024  3:25 PM        CONTINUE these medications which have NOT CHANGED    Details   calcium carbonate (Os-Carson) 500 mg calcium (1,250 mg) chewable tablet Historical Med      calcium carbonate-vitamin D3 600 mg-5 mcg (200 unit) tablet Take 2 tablets by mouth once daily., Historical Med      fluticasone (Flonase) 50 mcg/actuation nasal spray Administer 1 spray into each nostril once daily. Shake gently. Before first use, prime pump. After use, clean tip and replace cap., Starting Mon 2/12/2024, Until Tue 2/11/2025, Normal      levothyroxine  (Synthroid, Levoxyl) 88 mcg tablet Take 1 tablet (88 mcg) by mouth once daily., Starting Mon 8/14/2023, Normal             ALLERGIES     Patient has no known allergies.    FAMILY HISTORY     No family history on file.       SOCIAL HISTORY       Social History     Socioeconomic History    Marital status:      Spouse name: None    Number of children: None    Years of education: None    Highest education level: None   Occupational History    None   Tobacco Use    Smoking status: Never    Smokeless tobacco: Current   Vaping Use    Vaping status: Never Used   Substance and Sexual Activity    Alcohol use: Never    Drug use: Never    Sexual activity: None   Other Topics Concern    None   Social History Narrative    None     Social Determinants of Health     Financial Resource Strain: Not on file   Food Insecurity: Not on file   Transportation Needs: Not on file   Physical Activity: Not on file   Stress: Not on file   Social Connections: Not on file   Intimate Partner Violence: Not on file   Housing Stability: Not on file       SCREENINGS                        PHYSICAL EXAM    (up to 7 for level 4, 8 or more for level 5)     ED Triage Vitals [04/21/24 1313]   Temperature Heart Rate Respirations BP   36.4 °C (97.5 °F) 77 16 158/70      Pulse Ox Temp Source Heart Rate Source Patient Position   97 % Temporal Monitor --      BP Location FiO2 (%)     -- --       Physical Exam  Vitals and nursing note reviewed.   Constitutional:       General: She is not in acute distress.     Appearance: She is not ill-appearing.   HENT:      Head: Normocephalic and atraumatic.      Mouth/Throat:      Mouth: Mucous membranes are moist.      Pharynx: Oropharynx is clear.   Eyes:      General: No scleral icterus.     Extraocular Movements: Extraocular movements intact.   Cardiovascular:      Rate and Rhythm: Normal rate and regular rhythm.      Heart sounds: Normal heart sounds.   Pulmonary:      Effort: Pulmonary effort is normal. No  respiratory distress.      Breath sounds: Normal breath sounds.   Abdominal:      General: There is no distension.      Palpations: Abdomen is soft.      Tenderness: There is no abdominal tenderness.   Skin:     General: Skin is warm and dry.   Neurological:      General: No focal deficit present.          DIAGNOSTIC RESULTS     LABS:  Labs Reviewed   CBC WITH AUTO DIFFERENTIAL - Abnormal       Result Value    WBC 4.9      nRBC 0.0      RBC 3.79 (*)     Hemoglobin 11.7 (*)     Hematocrit 34.1 (*)     MCV 90      MCH 30.9      MCHC 34.3      RDW 12.2      Platelets 179      Neutrophils % 58.2      Immature Granulocytes %, Automated 0.2      Lymphocytes % 26.1      Monocytes % 11.6      Eosinophils % 3.5      Basophils % 0.4      Neutrophils Absolute 2.86      Immature Granulocytes Absolute, Automated 0.01      Lymphocytes Absolute 1.28      Monocytes Absolute 0.57      Eosinophils Absolute 0.17      Basophils Absolute 0.02     COMPREHENSIVE METABOLIC PANEL - Abnormal    Glucose 96      Sodium 135 (*)     Potassium 4.0      Chloride 99      Bicarbonate 30      Anion Gap 10      Urea Nitrogen 17      Creatinine 0.70      eGFR 88      Calcium 8.8      Albumin 4.2      Alkaline Phosphatase 59      Total Protein 6.3 (*)     AST 21      Bilirubin, Total 0.4      ALT 15     URINALYSIS WITH REFLEX CULTURE AND MICROSCOPIC - Abnormal    Color, Urine Straw      Appearance, Urine Clear      Specific Gravity, Urine 1.004 (*)     pH, Urine 9.0 (*)     Protein, Urine NEGATIVE      Glucose, Urine NEGATIVE      Blood, Urine NEGATIVE      Ketones, Urine NEGATIVE      Bilirubin, Urine NEGATIVE      Urobilinogen, Urine <2.0      Nitrite, Urine NEGATIVE      Leukocyte Esterase, Urine NEGATIVE     LIPASE - Normal    Lipase 37      Narrative:     Venipuncture immediately after or during the administration of Metamizole may lead to falsely low results. Testing should be performed immediately prior to Metamizole dosing.   LACTATE - Normal     Lactate 0.5      Narrative:     Venipuncture immediately after or during the administration of Metamizole may lead to falsely low results. Testing should be performed immediately  prior to Metamizole dosing.   URINALYSIS WITH REFLEX CULTURE AND MICROSCOPIC    Narrative:     The following orders were created for panel order Urinalysis with Reflex Culture and Microscopic.  Procedure                               Abnormality         Status                     ---------                               -----------         ------                     Urinalysis with Reflex C...[[id number]]  Abnormal            Final result               Extra Urine Gray Tube[[id number]]                            In process                   Please view results for these tests on the individual orders.   EXTRA URINE GRAY TUBE       All other labs were within normal range or not returned as of this dictation.    Imaging  No orders to display        Procedures  Procedures     EMERGENCY DEPARTMENT COURSE/MDM:     Diagnoses as of 04/21/24 1558   Right upper quadrant abdominal pain        Medical Decision Making  History obtained from the patient.  Records including labs, imaging, notes reviewed.  Patient completely asymptomatic at presentation; medications and fluids deferred.  Lactate, lipase within normal limits.  Urinalysis unremarkable.  CMP with a mild hyponatremia of 135 only.  CBC with a stable chronic anemia of hemoglobin 7.7.  Patient's abdominal exam completely benign and she was able to pass p.o. challenge.  Pain may be due to gastritis or something that she ate.  With a normal CMP, unlikely to be biliary pathology and patient status post cholecystectomy ruling out biliary colic.  Patient given reassurance and discharged home in satisfactory condition.  All questions answered and return precautions discussed.    Patient and or family in agreement and understanding of treatment plan.  All questions answered.      I reviewed the case with  the attending ED physician. The attending ED physician agrees with the plan. Patient and/or patient´s representative was counseled regarding labs, imaging, likely diagnosis, and plan. All questions were answered.    ED Medications administered this visit:  Medications - No data to display    New Prescriptions from this visit:    Discharge Medication List as of 4/21/2024  3:25 PM          Follow-up:  No follow-up provider specified.      Final Impression:   1. Right upper quadrant abdominal pain          (Please note that portions of this note were completed with a voice recognition program.  Efforts were made to edit the dictations but occasionally words are mis-transcribed.)     Aquilino Julian MD  Resident  04/21/24 [id number]

## 2024-04-22 LAB — HOLD SPECIMEN: NORMAL

## 2024-06-03 ENCOUNTER — APPOINTMENT (OUTPATIENT)
Dept: PRIMARY CARE | Facility: CLINIC | Age: 79
End: 2024-06-03
Payer: MEDICARE

## 2024-06-03 ENCOUNTER — OFFICE VISIT (OUTPATIENT)
Dept: PRIMARY CARE | Facility: CLINIC | Age: 79
End: 2024-06-03
Payer: MEDICARE

## 2024-06-03 VITALS
RESPIRATION RATE: 16 BRPM | SYSTOLIC BLOOD PRESSURE: 126 MMHG | DIASTOLIC BLOOD PRESSURE: 62 MMHG | WEIGHT: 128.4 LBS | OXYGEN SATURATION: 96 % | HEIGHT: 63 IN | TEMPERATURE: 97.2 F | BODY MASS INDEX: 22.75 KG/M2 | HEART RATE: 73 BPM

## 2024-06-03 DIAGNOSIS — Z00.00 ANNUAL PHYSICAL EXAM: Primary | ICD-10-CM

## 2024-06-03 DIAGNOSIS — E78.5 DYSLIPIDEMIA: ICD-10-CM

## 2024-06-03 DIAGNOSIS — E03.8 OTHER SPECIFIED HYPOTHYROIDISM: ICD-10-CM

## 2024-06-03 DIAGNOSIS — Z12.31 SCREENING MAMMOGRAM FOR BREAST CANCER: ICD-10-CM

## 2024-06-03 DIAGNOSIS — E53.8 VITAMIN B12 DEFICIENCY: ICD-10-CM

## 2024-06-03 PROBLEM — J04.0: Status: RESOLVED | Noted: 2024-03-04 | Resolved: 2024-06-03

## 2024-06-03 PROBLEM — H66.92 LEFT OTITIS MEDIA: Status: RESOLVED | Noted: 2022-03-02 | Resolved: 2024-06-03

## 2024-06-03 PROCEDURE — 1157F ADVNC CARE PLAN IN RCRD: CPT | Performed by: INTERNAL MEDICINE

## 2024-06-03 PROCEDURE — 1036F TOBACCO NON-USER: CPT | Performed by: INTERNAL MEDICINE

## 2024-06-03 PROCEDURE — 1159F MED LIST DOCD IN RCRD: CPT | Performed by: INTERNAL MEDICINE

## 2024-06-03 PROCEDURE — 1160F RVW MEDS BY RX/DR IN RCRD: CPT | Performed by: INTERNAL MEDICINE

## 2024-06-03 PROCEDURE — 99397 PER PM REEVAL EST PAT 65+ YR: CPT | Performed by: INTERNAL MEDICINE

## 2024-06-03 PROCEDURE — 1123F ACP DISCUSS/DSCN MKR DOCD: CPT | Performed by: INTERNAL MEDICINE

## 2024-06-03 ASSESSMENT — ENCOUNTER SYMPTOMS
COUGH: 0
UNEXPECTED WEIGHT CHANGE: 0
DYSURIA: 0
SHORTNESS OF BREATH: 0
ABDOMINAL PAIN: 0
DIZZINESS: 0
WHEEZING: 0
CONSTIPATION: 0
CONFUSION: 0
JOINT SWELLING: 0
CHEST TIGHTNESS: 0
VOMITING: 0
FATIGUE: 0
SLEEP DISTURBANCE: 0
CHILLS: 0
WEAKNESS: 0
NAUSEA: 0
SORE THROAT: 0
DIARRHEA: 0
ARTHRALGIAS: 1
PALPITATIONS: 0
ADENOPATHY: 0

## 2024-06-03 ASSESSMENT — PATIENT HEALTH QUESTIONNAIRE - PHQ9
1. LITTLE INTEREST OR PLEASURE IN DOING THINGS: NOT AT ALL
SUM OF ALL RESPONSES TO PHQ9 QUESTIONS 1 AND 2: 0
2. FEELING DOWN, DEPRESSED OR HOPELESS: NOT AT ALL

## 2024-06-03 NOTE — PROGRESS NOTES
Subjective   Yumiko Richter is a 78 y.o. female who presents for Annual Exam (CPE  and thyroid follow up ).  CPE  Follow  thyroid   Never a smoker  Eye exam- 4.2024  Mammogram last done- 12.5.23  Dexa-12.4.23 ordered on 3.4.2024  Seen in er for abd pain, report rev        Review of Systems   Constitutional:  Negative for chills, fatigue and unexpected weight change.        Comment   HENT:  Negative for congestion, ear pain and sore throat.    Respiratory:  Negative for cough, chest tightness, shortness of breath and wheezing.    Cardiovascular:  Negative for palpitations and leg swelling.   Gastrointestinal:  Negative for abdominal pain, constipation, diarrhea, nausea and vomiting.   Genitourinary:  Negative for dysuria and urgency.   Musculoskeletal:  Positive for arthralgias. Negative for joint swelling.        Sometimes    Skin:  Negative for rash.   Neurological:  Negative for dizziness and weakness.   Hematological:  Negative for adenopathy.   Psychiatric/Behavioral:  Negative for confusion and sleep disturbance.    All other systems reviewed and are negative.      Objective   Physical Exam  Constitutional:       Appearance: Normal appearance.   HENT:      Head: Normocephalic and atraumatic.   Eyes:      Conjunctiva/sclera: Conjunctivae normal.      Pupils: Pupils are equal, round, and reactive to light.   Neck:      Vascular: No carotid bruit.   Cardiovascular:      Rate and Rhythm: Normal rate and regular rhythm.      Heart sounds: No murmur heard.  Pulmonary:      Effort: Pulmonary effort is normal. No respiratory distress.      Breath sounds: Normal breath sounds. No wheezing, rhonchi or rales.   Chest:      Chest wall: No tenderness.   Abdominal:      General: Bowel sounds are normal. There is no distension.      Palpations: Abdomen is soft. There is no mass.      Tenderness: There is no abdominal tenderness.   Musculoskeletal:         General: Normal range of motion.      Cervical back: Normal range of motion  "and neck supple.   Lymphadenopathy:      Cervical: No cervical adenopathy.   Skin:     General: Skin is warm.      Coloration: Skin is not jaundiced.      Findings: No rash.   Neurological:      General: No focal deficit present.      Mental Status: She is alert and oriented to person, place, and time. Mental status is at baseline.      Motor: No weakness.      Gait: Gait normal.   Psychiatric:         Mood and Affect: Mood normal.         Behavior: Behavior normal.         Judgment: Judgment normal.       /62 (BP Location: Left arm, Patient Position: Sitting)   Pulse 73   Temp 36.2 °C (97.2 °F)   Resp 16   Ht 1.6 m (5' 3\")   Wt 58.2 kg (128 lb 6.4 oz)   SpO2 96%   BMI 22.75 kg/m²     Lab Results   Component Value Date    WBC 4.9 04/21/2024    HGB 11.7 (L) 04/21/2024    HCT 34.1 (L) 04/21/2024    MCV 90 04/21/2024     04/21/2024     Lab Results   Component Value Date    GLUCOSE 96 04/21/2024    CALCIUM 8.8 04/21/2024     (L) 04/21/2024    K 4.0 04/21/2024    CO2 30 04/21/2024    CL 99 04/21/2024    BUN 17 04/21/2024    CREATININE 0.70 04/21/2024     Lab Results   Component Value Date    ALT 15 04/21/2024    AST 21 04/21/2024    ALKPHOS 59 04/21/2024    BILITOT 0.4 04/21/2024     Lab Results   Component Value Date    TSH 0.51 02/29/2024       Assessment/Plan   Problem List Items Addressed This Visit       Dyslipidemia    Hypothyroidism    Vitamin B12 deficiency     Resolved on po tx          Other Visit Diagnoses       Annual physical exam    -  Primary            "

## 2024-06-03 NOTE — PATIENT INSTRUCTIONS
Was nice seeing you today.  Continue same medication.  Have lab work done before next appointment if labs were ordered today.  Fu in 6 month/prn  Call/ contact our office with any concerns.    If you have labs or test done and you can't see the report in your chart or you didn't here from us in 2 weeks after test/labs done , please, call our office for reports.  Please , do not assume that they were normal.

## 2024-08-02 DIAGNOSIS — E03.9 ACQUIRED HYPOTHYROIDISM: Primary | ICD-10-CM

## 2024-08-02 RX ORDER — LEVOTHYROXINE SODIUM 88 UG/1
88 TABLET ORAL DAILY
Qty: 90 TABLET | Refills: 3 | Status: SHIPPED | OUTPATIENT
Start: 2024-08-02

## 2024-12-04 ENCOUNTER — APPOINTMENT (OUTPATIENT)
Dept: PRIMARY CARE | Facility: CLINIC | Age: 79
End: 2024-12-04
Payer: MEDICARE

## 2024-12-04 VITALS
BODY MASS INDEX: 22.32 KG/M2 | WEIGHT: 126 LBS | TEMPERATURE: 97.1 F | OXYGEN SATURATION: 97 % | HEIGHT: 63 IN | RESPIRATION RATE: 16 BRPM | DIASTOLIC BLOOD PRESSURE: 60 MMHG | SYSTOLIC BLOOD PRESSURE: 120 MMHG

## 2024-12-04 DIAGNOSIS — E55.9 VITAMIN D DEFICIENCY: ICD-10-CM

## 2024-12-04 DIAGNOSIS — E78.5 DYSLIPIDEMIA: ICD-10-CM

## 2024-12-04 DIAGNOSIS — R91.8 LUNG NODULES: ICD-10-CM

## 2024-12-04 DIAGNOSIS — D72.819 LEUKOPENIA, UNSPECIFIED TYPE: ICD-10-CM

## 2024-12-04 DIAGNOSIS — E03.8 OTHER SPECIFIED HYPOTHYROIDISM: Primary | ICD-10-CM

## 2024-12-04 DIAGNOSIS — E53.8 VITAMIN B12 DEFICIENCY: ICD-10-CM

## 2024-12-04 PROBLEM — E85.4 ORGAN-LIMITED AMYLOIDOSIS: Status: ACTIVE | Noted: 2024-12-04

## 2024-12-04 PROBLEM — E85.4 ORGAN-LIMITED AMYLOIDOSIS: Status: RESOLVED | Noted: 2024-12-04 | Resolved: 2024-12-04

## 2024-12-04 PROCEDURE — 99213 OFFICE O/P EST LOW 20 MIN: CPT | Performed by: INTERNAL MEDICINE

## 2024-12-04 PROCEDURE — 1159F MED LIST DOCD IN RCRD: CPT | Performed by: INTERNAL MEDICINE

## 2024-12-04 PROCEDURE — 1157F ADVNC CARE PLAN IN RCRD: CPT | Performed by: INTERNAL MEDICINE

## 2024-12-04 PROCEDURE — 1123F ACP DISCUSS/DSCN MKR DOCD: CPT | Performed by: INTERNAL MEDICINE

## 2024-12-04 PROCEDURE — 1036F TOBACCO NON-USER: CPT | Performed by: INTERNAL MEDICINE

## 2024-12-04 PROCEDURE — 1160F RVW MEDS BY RX/DR IN RCRD: CPT | Performed by: INTERNAL MEDICINE

## 2024-12-04 ASSESSMENT — ENCOUNTER SYMPTOMS
DYSURIA: 0
CHILLS: 0
COUGH: 0
NAUSEA: 0
CONSTIPATION: 0
ADENOPATHY: 0
VOMITING: 0
ABDOMINAL PAIN: 0
ARTHRALGIAS: 0
CHEST TIGHTNESS: 0
CONFUSION: 0
WHEEZING: 0
SLEEP DISTURBANCE: 0
DIZZINESS: 0
WEAKNESS: 0
SORE THROAT: 0
JOINT SWELLING: 0
DIARRHEA: 0
PALPITATIONS: 0
FATIGUE: 0
SHORTNESS OF BREATH: 0
UNEXPECTED WEIGHT CHANGE: 0

## 2024-12-04 ASSESSMENT — PATIENT HEALTH QUESTIONNAIRE - PHQ9
2. FEELING DOWN, DEPRESSED OR HOPELESS: NOT AT ALL
1. LITTLE INTEREST OR PLEASURE IN DOING THINGS: NOT AT ALL
SUM OF ALL RESPONSES TO PHQ9 QUESTIONS 1 AND 2: 0

## 2024-12-04 NOTE — PATIENT INSTRUCTIONS
Was nice seeing you today.  Continue same medication.  Have lab work done before next appointment if labs were ordered today.  Fu AWSTACIE  Call/ contact our office with any concerns.    If you have labs or test done and you can't see the report in your chart or you didn't hear from us in 2 weeks after test/labs done , please, call our office for reports.  Please , do not assume that they were normal.    Any test results  and questions you might have , will be discussed at next visit -- please make sure to make a follow up appt after testing if reports are abnormal or you have questions.

## 2024-12-04 NOTE — PROGRESS NOTES
"Subjective   Yumiko Richter is a 79 y.o. female who presents for Follow-up (6 months follow up).  6 months follow up thyroid  Patient states she feels good,no  complains  Patient decline flu vaccine today       Review of Systems   Constitutional:  Negative for chills, fatigue and unexpected weight change.        Comment   HENT:  Negative for congestion, ear pain and sore throat.    Respiratory:  Negative for cough, chest tightness, shortness of breath and wheezing.    Cardiovascular:  Negative for palpitations and leg swelling.   Gastrointestinal:  Negative for abdominal pain, constipation, diarrhea, nausea and vomiting.   Genitourinary:  Negative for dysuria and urgency.   Musculoskeletal:  Negative for arthralgias and joint swelling.   Skin:  Negative for rash.   Neurological:  Negative for dizziness and weakness.   Hematological:  Negative for adenopathy.   Psychiatric/Behavioral:  Negative for confusion and sleep disturbance.        Objective   Physical Exam  Constitutional:       Appearance: Normal appearance.   HENT:      Head: Normocephalic and atraumatic.   Eyes:      Pupils: Pupils are equal, round, and reactive to light.   Cardiovascular:      Rate and Rhythm: Normal rate and regular rhythm.   Pulmonary:      Effort: Pulmonary effort is normal.      Breath sounds: Normal breath sounds.   Musculoskeletal:         General: Normal range of motion.      Cervical back: Normal range of motion and neck supple.   Skin:     General: Skin is warm.   Neurological:      General: No focal deficit present.      Mental Status: She is alert and oriented to person, place, and time.   Psychiatric:         Mood and Affect: Mood normal.         Behavior: Behavior normal.       /60 (BP Location: Left arm, Patient Position: Sitting)   Temp 36.2 °C (97.1 °F)   Resp 16   Ht 1.6 m (5' 3\")   Wt 57.2 kg (126 lb)   SpO2 97%   BMI 22.32 kg/m²     IMPRESSION:  1. Coronary artery calcium score of 0.62.  2. Stable subcentimeter " parenchymal airspace opacities.  3. Residual ground-glass opacities may reflect prior  infectious/inflammatory changes/bronchiolitis.*.      *Coronary Artery  Agatston score      Score  risk  Very low 1-99  Mildly increased 100-299  Moderately increased >300  Moderate to severely increased >800  Impression ct chest   Stable bilateral subcentimeter pulmonary nodules compared to studies  dating back to 03/15/2017. Many of these nodules are stable compared  to older study from 03/18/2014.  No new or enlarging pulmonary nodule.  Fleischner guidelines: Multiple non-calcified pulmonary nodules  measuring less than 6 mm, likely benign. No further follow-up is  required, however, if the patient has high risk factors for primary  lung malignancy, follow-up noncontrast CT scan chest in 12 months may  be obtained. (Ron López et al., Guidelines for management of     Assessment/Plan   Problem List Items Addressed This Visit       Dyslipidemia     Calcium score is 0.62         Relevant Orders    Lipid Panel    Comprehensive Metabolic Panel    Hypothyroidism - Primary    Relevant Orders    TSH with reflex to Free T4 if abnormal    Magnesium    Leucopenia    Relevant Orders    CBC and Auto Differential    Vitamin B12    Lung nodules     Ct  stable since 2017, no more ct chest.         Vitamin B12 deficiency    Relevant Orders    Vitamin B12    Vitamin D deficiency    Relevant Orders    Vitamin D 25-Hydroxy,Total (for eval of Vitamin D levels)

## 2025-01-28 ENCOUNTER — TELEPHONE (OUTPATIENT)
Dept: PRIMARY CARE | Facility: CLINIC | Age: 80
End: 2025-01-28
Payer: MEDICARE

## 2025-01-28 NOTE — TELEPHONE ENCOUNTER
Daughter is requesting a call back asap- she states the patient had an episode, possibly dementia related: has not slept, scared all night.  Daughter requesting call back to explain in more detail. [phone]

## 2025-01-29 ENCOUNTER — OFFICE VISIT (OUTPATIENT)
Dept: PRIMARY CARE | Facility: CLINIC | Age: 80
End: 2025-01-29
Payer: MEDICARE

## 2025-01-29 VITALS
HEART RATE: 80 BPM | TEMPERATURE: 97.4 F | BODY MASS INDEX: 21.62 KG/M2 | OXYGEN SATURATION: 97 % | WEIGHT: 122 LBS | DIASTOLIC BLOOD PRESSURE: 66 MMHG | SYSTOLIC BLOOD PRESSURE: 120 MMHG | RESPIRATION RATE: 16 BRPM | HEIGHT: 63 IN

## 2025-01-29 DIAGNOSIS — G47.09 OTHER INSOMNIA: ICD-10-CM

## 2025-01-29 DIAGNOSIS — R44.0 SEVERE AUDITORY HALLUCINATIONS: Primary | ICD-10-CM

## 2025-01-29 PROCEDURE — 1160F RVW MEDS BY RX/DR IN RCRD: CPT | Performed by: INTERNAL MEDICINE

## 2025-01-29 PROCEDURE — 99215 OFFICE O/P EST HI 40 MIN: CPT | Performed by: INTERNAL MEDICINE

## 2025-01-29 PROCEDURE — 1123F ACP DISCUSS/DSCN MKR DOCD: CPT | Performed by: INTERNAL MEDICINE

## 2025-01-29 PROCEDURE — 1157F ADVNC CARE PLAN IN RCRD: CPT | Performed by: INTERNAL MEDICINE

## 2025-01-29 PROCEDURE — 1159F MED LIST DOCD IN RCRD: CPT | Performed by: INTERNAL MEDICINE

## 2025-01-29 PROCEDURE — 1036F TOBACCO NON-USER: CPT | Performed by: INTERNAL MEDICINE

## 2025-01-29 RX ORDER — QUETIAPINE FUMARATE 25 MG/1
12.5 TABLET, FILM COATED ORAL NIGHTLY
Qty: 15 TABLET | Refills: 5 | Status: SHIPPED | OUTPATIENT
Start: 2025-01-29 | End: 2025-07-28

## 2025-01-29 NOTE — ASSESSMENT & PLAN NOTE
Sleeping about 3 to 4 hours /night for many , many years., since she came in this country. She gets up and cooks ,etc, some time she gets a nap.

## 2025-01-29 NOTE — PATIENT INSTRUCTIONS
Was nice seeing you today.  Continue same medication.  Have lab work done before next appointment if labs were ordered today.  Fu in 1 month.  Call/ contact our office with any concerns.  Have labs , mri done, see kole , neurology , start Seroquel 1/2 pill at night  If you have labs or test done and you can't see the report in your chart or you didn't hear from us in 2 weeks after test/labs done , please, call our office for reports.  Please , do not assume that they were normal.    Any test results  and questions you might have , will be discussed at next visit -- please make sure to make a follow up appt after testing if reports are abnormal or you have questions.

## 2025-01-29 NOTE — PROGRESS NOTES
Subjective   Yumiko Richter is a 79 y.o. female who presents for Memory Loss (Memory  loss ).  Patient here today to  follow up  on memories  problems - 2 nights  ago  she woke up  was  disoriented  at 2AM, looking for somebody  was afraid  somebody  will come  and  kill them    alight  scarred  her  and got very anxious , scarred , stating she hears  voices  and can not  stop it ,   states telling her  something bad  will happened to her son, family .  From 2 am to about 7 am , with her  witnessing her she was hearing voices telling her to go under the table becouse some one is trying to kill them and she went to hide several times. Pt remembers the voices and  saying that he tried to calm her  but couldn't.   Never  happened  this  before ,  She is hearing voices for about 1 year but she and her family ignored them, never acted on them  She used to go out of the house in the middle of the night before but was in purpose , to shovel snow, so now family is worried .  She had minor memory issues before, didn't remember staff. Otherwise she is very active and independent.  Has hearing aids.  Patient  mom had dementia ,  memory loss   Not sleeping well     Thomas  here today  and daughter in law  Kendal ,   family    confirmed all these ,patient does not  remember all the  incidents    MMS   test  done -  22 today  Advised not to drive until further eval      Review of Systems   All other systems reviewed and are negative.      Objective   Physical Exam  Constitutional:       Appearance: Normal appearance.   HENT:      Head: Normocephalic and atraumatic.   Eyes:      Pupils: Pupils are equal, round, and reactive to light.   Cardiovascular:      Rate and Rhythm: Normal rate and regular rhythm.   Pulmonary:      Effort: Pulmonary effort is normal.      Breath sounds: Normal breath sounds.   Musculoskeletal:         General: Normal range of motion.      Cervical back: Normal range of motion and neck  "supple.   Skin:     General: Skin is warm.   Neurological:      General: No focal deficit present.      Mental Status: She is alert and oriented to person, place, and time.   Psychiatric:         Mood and Affect: Mood normal.         Behavior: Behavior normal.       /66 (BP Location: Right arm, Patient Position: Sitting)   Pulse 80   Temp 36.3 °C (97.4 °F)   Resp 16   Ht 1.6 m (5' 3\")   Wt 55.3 kg (122 lb)   SpO2 97%   BMI 21.61 kg/m²       Assessment/Plan   Problem List Items Addressed This Visit       Severe auditory hallucinations - Primary    Relevant Medications    QUEtiapine (SEROquel) 25 mg tablet    Other Relevant Orders    MR brain w and wo IV contrast    Referral to Psychiatry    Referral to Neurology    Other insomnia     Sleeping about 3 to 4 hours /night for many , many years., since she came in this country. She gets up and cooks ,etc, some time she gets a nap.              "

## 2025-01-31 LAB
25(OH)D3+25(OH)D2 SERPL-MCNC: 27 NG/ML (ref 30–100)
ALBUMIN SERPL-MCNC: 4.4 G/DL (ref 3.6–5.1)
ALP SERPL-CCNC: 58 U/L (ref 37–153)
ALT SERPL-CCNC: 14 U/L (ref 6–29)
ANION GAP SERPL CALCULATED.4IONS-SCNC: 10 MMOL/L (CALC) (ref 7–17)
AST SERPL-CCNC: 20 U/L (ref 10–35)
BASOPHILS # BLD AUTO: 39 CELLS/UL (ref 0–200)
BASOPHILS NFR BLD AUTO: 0.8 %
BILIRUB SERPL-MCNC: 0.6 MG/DL (ref 0.2–1.2)
BUN SERPL-MCNC: 15 MG/DL (ref 7–25)
CALCIUM SERPL-MCNC: 9.3 MG/DL (ref 8.6–10.4)
CHLORIDE SERPL-SCNC: 102 MMOL/L (ref 98–110)
CHOLEST SERPL-MCNC: 177 MG/DL
CHOLEST/HDLC SERPL: 2.3 (CALC)
CO2 SERPL-SCNC: 23 MMOL/L (ref 20–32)
CREAT SERPL-MCNC: 0.83 MG/DL (ref 0.6–0.95)
EGFRCR SERPLBLD CKD-EPI 2021: 71 ML/MIN/1.73M2
EOSINOPHIL # BLD AUTO: 211 CELLS/UL (ref 15–500)
EOSINOPHIL NFR BLD AUTO: 4.3 %
ERYTHROCYTE [DISTWIDTH] IN BLOOD BY AUTOMATED COUNT: 12.7 % (ref 11–15)
GLUCOSE SERPL-MCNC: 90 MG/DL (ref 65–99)
HCT VFR BLD AUTO: 38.7 % (ref 35–45)
HDLC SERPL-MCNC: 77 MG/DL
HGB BLD-MCNC: 12.9 G/DL (ref 11.7–15.5)
LDLC SERPL CALC-MCNC: 84 MG/DL (CALC)
LYMPHOCYTES # BLD AUTO: 1205 CELLS/UL (ref 850–3900)
LYMPHOCYTES NFR BLD AUTO: 24.6 %
MAGNESIUM SERPL-MCNC: 2.2 MG/DL (ref 1.5–2.5)
MCH RBC QN AUTO: 31.5 PG (ref 27–33)
MCHC RBC AUTO-ENTMCNC: 33.3 G/DL (ref 32–36)
MCV RBC AUTO: 94.4 FL (ref 80–100)
MONOCYTES # BLD AUTO: 637 CELLS/UL (ref 200–950)
MONOCYTES NFR BLD AUTO: 13 %
NEUTROPHILS # BLD AUTO: 2808 CELLS/UL (ref 1500–7800)
NEUTROPHILS NFR BLD AUTO: 57.3 %
NONHDLC SERPL-MCNC: 100 MG/DL (CALC)
PLATELET # BLD AUTO: 215 THOUSAND/UL (ref 140–400)
PMV BLD REES-ECKER: 11.6 FL (ref 7.5–12.5)
POTASSIUM SERPL-SCNC: 4.1 MMOL/L (ref 3.5–5.3)
PROT SERPL-MCNC: 6.7 G/DL (ref 6.1–8.1)
RBC # BLD AUTO: 4.1 MILLION/UL (ref 3.8–5.1)
SODIUM SERPL-SCNC: 135 MMOL/L (ref 135–146)
TRIGL SERPL-MCNC: 74 MG/DL
TSH SERPL-ACNC: 0.67 MIU/L (ref 0.4–4.5)
VIT B12 SERPL-MCNC: 396 PG/ML (ref 200–1100)
WBC # BLD AUTO: 4.9 THOUSAND/UL (ref 3.8–10.8)

## 2025-02-03 RX ORDER — ACETAMINOPHEN 500 MG
1 TABLET ORAL DAILY
COMMUNITY

## 2025-02-06 ENCOUNTER — APPOINTMENT (OUTPATIENT)
Dept: CARDIOLOGY | Facility: HOSPITAL | Age: 80
End: 2025-02-06
Payer: MEDICARE

## 2025-02-06 ENCOUNTER — APPOINTMENT (OUTPATIENT)
Dept: RADIOLOGY | Facility: HOSPITAL | Age: 80
End: 2025-02-06
Payer: MEDICARE

## 2025-02-06 ENCOUNTER — HOSPITAL ENCOUNTER (EMERGENCY)
Facility: HOSPITAL | Age: 80
Discharge: PSYCHIATRIC OR SUBSTANCE USE FACILITY | End: 2025-02-06
Attending: EMERGENCY MEDICINE
Payer: MEDICARE

## 2025-02-06 VITALS
TEMPERATURE: 97.7 F | RESPIRATION RATE: 16 BRPM | HEIGHT: 63 IN | HEART RATE: 75 BPM | SYSTOLIC BLOOD PRESSURE: 150 MMHG | BODY MASS INDEX: 21.62 KG/M2 | OXYGEN SATURATION: 96 % | WEIGHT: 122 LBS | DIASTOLIC BLOOD PRESSURE: 73 MMHG

## 2025-02-06 DIAGNOSIS — F03.918 DEMENTIA WITH BEHAVIORAL DISTURBANCE (MULTI): Primary | ICD-10-CM

## 2025-02-06 DIAGNOSIS — F23 ACUTE PSYCHOSIS (MULTI): ICD-10-CM

## 2025-02-06 LAB
ALBUMIN SERPL BCP-MCNC: 4.3 G/DL (ref 3.4–5)
ALP SERPL-CCNC: 52 U/L (ref 33–136)
ALT SERPL W P-5'-P-CCNC: 14 U/L (ref 7–45)
AMPHETAMINES UR QL SCN: NORMAL
ANION GAP SERPL CALC-SCNC: 12 MMOL/L (ref 10–20)
APAP SERPL-MCNC: <10 UG/ML
APPEARANCE UR: CLEAR
AST SERPL W P-5'-P-CCNC: 19 U/L (ref 9–39)
ATRIAL RATE: 83 BPM
BARBITURATES UR QL SCN: NORMAL
BASOPHILS # BLD AUTO: 0.03 X10*3/UL (ref 0–0.1)
BASOPHILS NFR BLD AUTO: 0.5 %
BENZODIAZ UR QL SCN: NORMAL
BILIRUB SERPL-MCNC: 0.4 MG/DL (ref 0–1.2)
BILIRUB UR STRIP.AUTO-MCNC: NEGATIVE MG/DL
BUN SERPL-MCNC: 14 MG/DL (ref 6–23)
BZE UR QL SCN: NORMAL
CALCIUM SERPL-MCNC: 9.3 MG/DL (ref 8.6–10.3)
CANNABINOIDS UR QL SCN: NORMAL
CHLORIDE SERPL-SCNC: 104 MMOL/L (ref 98–107)
CO2 SERPL-SCNC: 24 MMOL/L (ref 21–32)
COLOR UR: NORMAL
CREAT SERPL-MCNC: 0.69 MG/DL (ref 0.5–1.05)
EGFRCR SERPLBLD CKD-EPI 2021: 88 ML/MIN/1.73M*2
EOSINOPHIL # BLD AUTO: 0.05 X10*3/UL (ref 0–0.4)
EOSINOPHIL NFR BLD AUTO: 0.9 %
ERYTHROCYTE [DISTWIDTH] IN BLOOD BY AUTOMATED COUNT: 12.5 % (ref 11.5–14.5)
ETHANOL SERPL-MCNC: <10 MG/DL
FENTANYL+NORFENTANYL UR QL SCN: NORMAL
GLUCOSE SERPL-MCNC: 117 MG/DL (ref 74–99)
GLUCOSE UR STRIP.AUTO-MCNC: NORMAL MG/DL
HCT VFR BLD AUTO: 38.3 % (ref 36–46)
HGB BLD-MCNC: 12.7 G/DL (ref 12–16)
HOLD SPECIMEN: NORMAL
HOLD SPECIMEN: NORMAL
IMM GRANULOCYTES # BLD AUTO: 0.02 X10*3/UL (ref 0–0.5)
IMM GRANULOCYTES NFR BLD AUTO: 0.4 % (ref 0–0.9)
KETONES UR STRIP.AUTO-MCNC: NEGATIVE MG/DL
LEUKOCYTE ESTERASE UR QL STRIP.AUTO: NEGATIVE
LYMPHOCYTES # BLD AUTO: 0.75 X10*3/UL (ref 0.8–3)
LYMPHOCYTES NFR BLD AUTO: 13.2 %
MCH RBC QN AUTO: 30.5 PG (ref 26–34)
MCHC RBC AUTO-ENTMCNC: 33.2 G/DL (ref 32–36)
MCV RBC AUTO: 92 FL (ref 80–100)
METHADONE UR QL SCN: NORMAL
MONOCYTES # BLD AUTO: 0.64 X10*3/UL (ref 0.05–0.8)
MONOCYTES NFR BLD AUTO: 11.3 %
NEUTROPHILS # BLD AUTO: 4.18 X10*3/UL (ref 1.6–5.5)
NEUTROPHILS NFR BLD AUTO: 73.7 %
NITRITE UR QL STRIP.AUTO: NEGATIVE
NRBC BLD-RTO: 0 /100 WBCS (ref 0–0)
OPIATES UR QL SCN: NORMAL
OXYCODONE+OXYMORPHONE UR QL SCN: NORMAL
P AXIS: 75 DEGREES
P OFFSET: 191 MS
P ONSET: 138 MS
PCP UR QL SCN: NORMAL
PH UR STRIP.AUTO: 7.5 [PH]
PLATELET # BLD AUTO: 181 X10*3/UL (ref 150–450)
POTASSIUM SERPL-SCNC: 3.6 MMOL/L (ref 3.5–5.3)
PR INTERVAL: 166 MS
PROT SERPL-MCNC: 6.8 G/DL (ref 6.4–8.2)
PROT UR STRIP.AUTO-MCNC: NEGATIVE MG/DL
Q ONSET: 221 MS
QRS COUNT: 14 BEATS
QRS DURATION: 78 MS
QT INTERVAL: 358 MS
QTC CALCULATION(BAZETT): 420 MS
QTC FREDERICIA: 399 MS
R AXIS: 79 DEGREES
RBC # BLD AUTO: 4.16 X10*6/UL (ref 4–5.2)
RBC # UR STRIP.AUTO: NEGATIVE MG/DL
SALICYLATES SERPL-MCNC: <3 MG/DL
SODIUM SERPL-SCNC: 136 MMOL/L (ref 136–145)
SP GR UR STRIP.AUTO: 1.01
T AXIS: 75 DEGREES
T OFFSET: 400 MS
UROBILINOGEN UR STRIP.AUTO-MCNC: NORMAL MG/DL
VENTRICULAR RATE: 83 BPM
WBC # BLD AUTO: 5.7 X10*3/UL (ref 4.4–11.3)

## 2025-02-06 PROCEDURE — 80320 DRUG SCREEN QUANTALCOHOLS: CPT | Performed by: NURSE PRACTITIONER

## 2025-02-06 PROCEDURE — 93010 ELECTROCARDIOGRAM REPORT: CPT | Performed by: EMERGENCY MEDICINE

## 2025-02-06 PROCEDURE — 85025 COMPLETE CBC W/AUTO DIFF WBC: CPT | Performed by: NURSE PRACTITIONER

## 2025-02-06 PROCEDURE — 93005 ELECTROCARDIOGRAM TRACING: CPT

## 2025-02-06 PROCEDURE — 80307 DRUG TEST PRSMV CHEM ANLYZR: CPT | Performed by: NURSE PRACTITIONER

## 2025-02-06 PROCEDURE — 70450 CT HEAD/BRAIN W/O DYE: CPT

## 2025-02-06 PROCEDURE — 99285 EMERGENCY DEPT VISIT HI MDM: CPT | Mod: 25 | Performed by: EMERGENCY MEDICINE

## 2025-02-06 PROCEDURE — 84075 ASSAY ALKALINE PHOSPHATASE: CPT | Performed by: NURSE PRACTITIONER

## 2025-02-06 PROCEDURE — 36415 COLL VENOUS BLD VENIPUNCTURE: CPT | Performed by: NURSE PRACTITIONER

## 2025-02-06 PROCEDURE — 70450 CT HEAD/BRAIN W/O DYE: CPT | Performed by: RADIOLOGY

## 2025-02-06 PROCEDURE — 81003 URINALYSIS AUTO W/O SCOPE: CPT | Performed by: EMERGENCY MEDICINE

## 2025-02-06 PROCEDURE — 2500000002 HC RX 250 W HCPCS SELF ADMINISTERED DRUGS (ALT 637 FOR MEDICARE OP, ALT 636 FOR OP/ED): Performed by: NURSE PRACTITIONER

## 2025-02-06 PROCEDURE — 99285 EMERGENCY DEPT VISIT HI MDM: CPT | Performed by: NURSE PRACTITIONER

## 2025-02-06 RX ORDER — QUETIAPINE FUMARATE 25 MG/1
12.5 TABLET, FILM COATED ORAL ONCE
Status: COMPLETED | OUTPATIENT
Start: 2025-02-06 | End: 2025-02-06

## 2025-02-06 RX ADMIN — QUETIAPINE FUMARATE 12.5 MG: 25 TABLET ORAL at 12:35

## 2025-02-06 SDOH — HEALTH STABILITY: MENTAL HEALTH: CONTENT: UNREMARKABLE

## 2025-02-06 SDOH — HEALTH STABILITY: MENTAL HEALTH: NON-SPECIFIC ACTIVE SUICIDAL THOUGHTS (PAST 1 MONTH): NO

## 2025-02-06 SDOH — HEALTH STABILITY: MENTAL HEALTH: IN THE PAST FEW WEEKS, HAVE YOU WISHED YOU WERE DEAD?: NO

## 2025-02-06 SDOH — HEALTH STABILITY: MENTAL HEALTH: SUICIDE ASSESSMENT: ADULT (C-SSRS)

## 2025-02-06 SDOH — HEALTH STABILITY: MENTAL HEALTH: HAVE YOU ACTUALLY HAD ANY THOUGHTS OF KILLING YOURSELF?: NO

## 2025-02-06 SDOH — HEALTH STABILITY: MENTAL HEALTH: ARE YOU HAVING THOUGHTS OF KILLING YOURSELF RIGHT NOW?: NO

## 2025-02-06 SDOH — SOCIAL STABILITY: SOCIAL NETWORK: PARENT/GUARDIAN/SIGNIFICANT OTHER INVOLVEMENT: ATTENTIVE TO PATIENT NEEDS

## 2025-02-06 SDOH — HEALTH STABILITY: MENTAL HEALTH: HAVE YOU EVER DONE ANYTHING, STARTED TO DO ANYTHING, OR PREPARED TO DO ANYTHING TO END YOUR LIFE?: NO

## 2025-02-06 SDOH — HEALTH STABILITY: MENTAL HEALTH: HALLUCINATION: COMMAND;AUDITORY;VISUAL

## 2025-02-06 SDOH — HEALTH STABILITY: MENTAL HEALTH: HAVE YOU WISHED YOU WERE DEAD OR WISHED YOU COULD GO TO SLEEP AND NOT WAKE UP?: NO

## 2025-02-06 SDOH — ECONOMIC STABILITY: HOUSING INSECURITY: FEELS SAFE LIVING IN HOME: NO

## 2025-02-06 SDOH — HEALTH STABILITY: MENTAL HEALTH: SUICIDAL BEHAVIOR (LIFETIME): NO

## 2025-02-06 SDOH — HEALTH STABILITY: MENTAL HEALTH: BEHAVIORS/MOOD: CALM;COOPERATIVE;PLEASANT

## 2025-02-06 SDOH — HEALTH STABILITY: MENTAL HEALTH: DEPRESSION SYMPTOMS: SLEEP DISTURBANCE

## 2025-02-06 SDOH — HEALTH STABILITY: MENTAL HEALTH: HAVE YOU EVER TRIED TO KILL YOURSELF?: NO

## 2025-02-06 SDOH — HEALTH STABILITY: MENTAL HEALTH: IN THE PAST WEEK, HAVE YOU BEEN HAVING THOUGHTS ABOUT KILLING YOURSELF?: NO

## 2025-02-06 SDOH — HEALTH STABILITY: MENTAL HEALTH: WISH TO BE DEAD (PAST 1 MONTH): NO

## 2025-02-06 SDOH — HEALTH STABILITY: MENTAL HEALTH: ANXIETY SYMPTOMS: GENERALIZED

## 2025-02-06 SDOH — HEALTH STABILITY: MENTAL HEALTH: BEHAVIORAL HEALTH(WDL): EXCEPTIONS TO WDL

## 2025-02-06 SDOH — HEALTH STABILITY: MENTAL HEALTH: NEEDS EXPRESSED: DENIES

## 2025-02-06 SDOH — SOCIAL STABILITY: SOCIAL INSECURITY: FAMILY BEHAVIORS: APPROPRIATE FOR SITUATION;CALM;COOPERATIVE;SUPPORTIVE

## 2025-02-06 SDOH — SOCIAL STABILITY: SOCIAL NETWORK: VISITOR BEHAVIORS: CALM;COOPERATIVE;SUPPORTIVE

## 2025-02-06 SDOH — HEALTH STABILITY: MENTAL HEALTH

## 2025-02-06 SDOH — HEALTH STABILITY: MENTAL HEALTH: IN THE PAST FEW WEEKS, HAVE YOU FELT THAT YOU OR YOUR FAMILY WOULD BE BETTER OFF IF YOU WERE DEAD?: NO

## 2025-02-06 ASSESSMENT — PAIN - FUNCTIONAL ASSESSMENT
PAIN_FUNCTIONAL_ASSESSMENT: 0-10
PAIN_FUNCTIONAL_ASSESSMENT: 0-10

## 2025-02-06 ASSESSMENT — LIFESTYLE VARIABLES
HAVE YOU EVER FELT YOU SHOULD CUT DOWN ON YOUR DRINKING: NO
HAVE PEOPLE ANNOYED YOU BY CRITICIZING YOUR DRINKING: NO
EVER FELT BAD OR GUILTY ABOUT YOUR DRINKING: NO
TOTAL SCORE: 0
SUBSTANCE_ABUSE_PAST_12_MONTHS: NO
PRESCIPTION_ABUSE_PAST_12_MONTHS: NO
EVER HAD A DRINK FIRST THING IN THE MORNING TO STEADY YOUR NERVES TO GET RID OF A HANGOVER: NO

## 2025-02-06 ASSESSMENT — PAIN SCALES - GENERAL
PAINLEVEL_OUTOF10: 0 - NO PAIN
PAINLEVEL_OUTOF10: 0 - NO PAIN

## 2025-02-06 NOTE — PROGRESS NOTES
"Capacity Assessment Tool    \"Capacity\" is the \"ability\" to make a decision.  The decision in question must be specific (one decision), relevant to a patient's current condition (appropriate), and timely (neither prospective nor retrospective).    Capacity varies based on knowledge base (explanation/understanding of clinical information), cognitive processing, acute psychiatric illness, and other clinical conditions.    In order to be deemed \"capacitated\" to make a single decision at one point in time, a patient must demonstrate all 4 of the following elements:    *Ability to consistently communicate a choice (consistent over time with adequate information)  *Ability to understand the relevant information (accurate knowledge of condition)  *Ability to appreciate the situation and its consequences (risks/benefits, pros/cons)  *Ability to reason about treatment options (without undue influence of a person or condition, eg. suicidality or acute psychosis)      Current Decision    Clinical issue:   Sudden onset audio hallucinations x 1 week causing stress and anxiety    Did the appropriate team address relevant information with the patient:  Yes    Date: 2/6/2025    If \"NO\" is selected for appropriate team, then please discuss with the appropriate team.  The appropriate team should be encouraged to address relevant information with the patient AND reevaluate capacity when appropriate.    Capacity Evaluation    Patient demonstrates ability to consistently communicate choice:  Yes patient chose to call the police due to the hallucinations, is aware that these are hallucinations but is causing her mental stress due to the anxiety    Patient demonstrates ability to understand the relevant information:  Yes     Patient demonstrates ability to appreciate the situation and its consequences:  Yes     Patient demonstrates ability to reason about treatment options:  Yes     If ANY of the above items are answered \"NO,\" the patient " LACKS CAPACITY for that specific decision at hand, at that specific time.  Further capacity evaluations can be done as needed.

## 2025-02-06 NOTE — SIGNIFICANT EVENT
Application for Emergency Admission      Ready for Transfer?  Is the patient medically cleared for transfer to inpatient psychiatry: Yes  Has the patient been accepted to an inpatient psychiatric hospital: Yes    Application for Emergency Admission  IN ACCORDANCE [place]  The Chief Clinical Officer of: [facility] 2/6/2025 .4:30 PM    Reason for Hospitalization  The undersigned has reason to believe that: Yumiko Richter Is a mentally ill person subject to hospitalization by court order under division B Section 5122.01 of [place] Revised Code, i.e., this person:    1.No  Represents a substantial risk of physical harm to self as manifested by evidence of threats of, or attempts at, suicide or serious self-inflicted bodily harm    2.No Represents a substantial risk of physical harm to others as manifested by evidence of recent homicidal or other violent behavior, evidence of recent threats that place another in reasonable fear of violent behavior and serious physical harm, or other evidence of present dangerousness    3.Yes Represents a substantial and immediate risk of serious physical impairment or injury to self as manifested by  evidence that the person is unable to provide for and is not providing for the person's basic physical needs because of the person's mental illness and that appropriate provision for those needs cannot be made  immediately available in the community    4.Yes Would benefit from treatment in a hospital for his mental illness and is in need of such treatment as manifested by evidence of behavior that creates a grave and imminent risk to substantial rights of others or  himself.    5.Yes Would benefit from treatment as manifested by evidence of behavior that indicates all of the following:       (a) The person is unlikely to survive safely in the community without supervision, based on a clinical determination.       (b) The person has a history of lack of compliance with  treatment for mental illness and one of the following applies:      (i) At least twice within the thirty-six months prior to the filing of an affidavit seeking court-ordered treatment of the person under section 5122.111 of the Revised Code, the lack of compliance has been a significant factor in necessitating hospitalization in a hospital or receipt of services in a forensic or other mental health unit of a correctional facility, provided that the thirty-six-month period shall be extended by the length of any hospitalization or incarceration of the person that occurred within the thirty-six-month period.      (ii) Within the forty-eight months prior to the filing of an affidavit seeking court-ordered treatment of the person under section 5122.111 of the Revised Code, the lack of compliance resulted in one or more acts of serious violent behavior toward self or others or threats of, or attempts at, serious physical harm to self or others, provided that the forty-eight-month period shall be extended by the length of any hospitalization or incarceration of the person that occurred within the forty-eight-month period.      (c) The person, as a result of mental illness, is unlikely to voluntarily participate in necessary treatment.       (d) In view of the person's treatment history and current behavior, the person is in need of treatment in order to prevent a relapse or deterioration that would be likely to result in substantial risk of serious harm to the person or others.    (e) Represents a substantial risk of physical harm to self or others if allowed to remain at liberty pending examination.    Therefore, it is requested that said person be admitted to the above named facility.    STATEMENT OF BELIEF    Must be filled out by one of the following: a psychiatrist, licensed physician, licensed clinical psychologist, health or ,  or .  (Statement shall include the circumstances under  which the individual was taken into custody and the reason for the person's belief that hospitalization is necessary. The statement shall also include a reference to efforts made to secure the individual's property at his residence if he was taken into custody there. Every reasonable and appropriate effort should be made to take this person into custody in the least conspicuous manner possible.)    Patient is having hallucinations and needs [facility] psych     Fabricio Ruiz DO 2/6/2025     _____________________________________________________________   Place of Employment: [facility]    STATEMENT OF OBSERVATION BY PSYCHIATRIST, LICENSED PHYSICIAN, OR LICENSED CLINICAL PSYCHOLOGIST, IF APPLICABLE    Place of Observation (e.g., [facility] mental [facility] center, general hospital, office, emergency facility)  (If applicable, please complete)    Fabricio Ruiz DO 2/6/2025    _____________________________________________________________

## 2025-02-06 NOTE — ED PROVIDER NOTES
"Emergency Department Encounter  [facility] [place] EMERGENCY MEDICINE    Patient: Yumiko Richter  MRN: [id number]  : 1945  Date of Evaluation: 2025  ED Provider: JACOBO Yung    ED care was supervised by Dr. Christine who independently examined and evaluated the patient. Please see their attestation note for further details.    Limitations to history: language  Independent Historian: self, family  Records reviewed: Care everywhere, paper chart, Inpatient and outpatient notes    Chief Complaint       Chief Complaint   Patient presents with    Hallucinations     Since Feb 3 2025, patient has been having visual and auditory hallucinations command in nature. Patient states it is \"all the time noise.\" Denies volatile commands from hallucinations;  states commands tell her to look for things and go places. Denies SI/HI     Eek    (Location/Symptom, Timing/Onset, Context/Setting, Quality, Duration, Modifying Factors, Severity) Note limiting factors.   Yumiko Richter is a 79 y.o. female who presents to the emergency department complaining of sudden onset audio and visual hallucinations, patient describes it as \"all the time noise\".  States that the voices are negative, telling her that her son is in the hospital and that police are coming to kill her.   states on Monday she went down into the basement and hit underneath the table because the voices told her to, also had told her that there was a chip in the carpet that was recording everything.  Patient understands that this is not normal and is causing excessive stress.  No suicidal or homicidal ideations.  No previous history of psychiatric issues.  Saw her primary care doctor and has follow-up with psychiatry, neurology, has an MRI ordered.  Presented to the emergency department today because patient called the police due to the persistence of the voices and it is causing her anxiety and stress.  No other symptoms, no chest pain, " shortness of breath, visual changes, abdominal pain, nausea, vomiting.  Patient does not drink any alcohol, no illicit drug use.  Collateral also obtained from patient's  and daughter, daughter states that she believes her mom has been hearing voices for longer than the last week however over the last month has been undergoing a lot of stressors, nephew suddenly passed away, daughter was recently diagnosed with cancer.  Also with family history of Alzheimer's.  Patient's daughter reports that patient has been under a lot of stress and believes that there are actually people at her house that are trying to steal things.  Patient states that she has called them on camera and believes that it is on her neighbors camera.    ROS:     Review of Systems  14 systems reviewed and otherwise acutely negative except as in the [place].          Past History     Past Medical History:   Diagnosis Date    Personal history of other endocrine, nutritional and metabolic disease 10/09/2019    History of hypothyroidism    Personal history of other infectious and parasitic diseases 11/03/2017    History of herpes zoster     Past Surgical History:   Procedure Laterality Date    OTHER SURGICAL HISTORY  03/24/2021    Cataract surgery     Social History     Socioeconomic History    Marital status:    Tobacco Use    Smoking status: Never    Smokeless tobacco: Never   Vaping Use    Vaping status: Never Used   Substance and Sexual Activity    Alcohol use: Never    Drug use: Never    Sexual activity: Defer       Medications/Allergies     Previous Medications    CALCIUM CARBONATE (OS-GILBERT) 500 MG CALCIUM (1,250 MG) CHEWABLE TABLET    Chew 1 tablet (1,250 mg) once daily.    CALCIUM CARBONATE-VITAMIN D3 600 MG-5 MCG (200 UNIT) TABLET    Take 2 tablets by mouth once daily.    CHOLECALCIFEROL (VITAMIN D3) 5,000 UNITS TABLET    Take 1 tablet (5,000 Units) by mouth once daily.    LEVOTHYROXINE (SYNTHROID, LEVOXYL) 88 MCG TABLET    TAKE ONE  TABLET BY MOUTH DAILY    QUETIAPINE (SEROQUEL) 25 MG TABLET    Take 0.5 tablets (12.5 mg) by mouth once daily at bedtime.     No Known Allergies     Physical Exam       ED Triage Vitals [02/06/25 0721]   Temperature Heart Rate Resp BP   36.5 °C (97.7 °F) 85 -- 133/69      Pulse Ox Temp Source Heart Rate Source Patient Position   98 % Tympanic Monitor Sitting      BP Location FiO2 (%)     Right arm --         Physical Exam    GENERAL:  The patient appears nourished and normally developed. Vital signs as documented.     HEENT:  Head normocephalic, atraumatic, EOMs intact, PERRLA, Mucous membranes moist. Nares patent without copious rhinorrhea.  No lymphadenopathy.    PULMONARY:  Lungs are clear to auscultation, without any respiratory distress. Able to speak full sentences, no accessory muscle use    CARDIAC:   Normal rate. No murmurs, rubs or gallops    ABDOMEN:  Soft, non-distended, non-tender, BS positive x 4 quadrants, No rebound or guarding, no peritoneal signs, no CVA tenderness, no masses or organomegaly    MUSCULOSKELETAL:   Able to ambulate, Non edematous, with no obvious deformities. Pulses intact distal    SKIN:   Good color, with no significant rashes.  No pallor.    NEURO:  No obvious neurological deficits, normal sensation and strength bilaterally.  Able to follow commands, NIH 0, CN 2-12 intact.    Psych: Calm, cooperative, good hygiene, poor insight    Diagnostics   Labs:  Results for orders placed or performed during the hospital encounter of 02/06/25   Electrocardiogram, 12-lead    Collection Time: 02/06/25  7:41 AM   Result Value Ref Range    Ventricular Rate 83 BPM    Atrial Rate 83 BPM    TX Interval 166 ms    QRS Duration 78 ms    QT Interval 358 ms    QTC Calculation(Bazett) 420 ms    P Axis 75 degrees    R Axis 79 degrees    T Axis 75 degrees    QRS Count 14 beats    Q Onset 221 ms    P Onset 138 ms    P Offset 191 ms    T Offset 400 ms    QTC Fredericia 399 ms   CBC and Auto Differential     Collection Time: 02/06/25  7:47 AM   Result Value Ref Range    WBC 5.7 4.4 - 11.3 x10*3/uL    nRBC 0.0 0.0 - 0.0 /100 WBCs    RBC 4.16 4.00 - 5.20 x10*6/uL    Hemoglobin 12.7 12.0 - 16.0 g/dL    Hematocrit 38.3 36.0 - 46.0 %    MCV 92 80 - 100 fL    MCH 30.5 26.0 - 34.0 pg    MCHC 33.2 32.0 - 36.0 g/dL    RDW 12.5 11.5 - 14.5 %    Platelets 181 150 - 450 x10*3/uL    Neutrophils % 73.7 40.0 - 80.0 %    Immature Granulocytes %, Automated 0.4 0.0 - 0.9 %    Lymphocytes % 13.2 13.0 - 44.0 %    Monocytes % 11.3 2.0 - 10.0 %    Eosinophils % 0.9 0.0 - 6.0 %    Basophils % 0.5 0.0 - 2.0 %    Neutrophils Absolute 4.18 1.60 - 5.50 x10*3/uL    Immature Granulocytes Absolute, Automated 0.02 0.00 - 0.50 x10*3/uL    Lymphocytes Absolute 0.75 (L) 0.80 - 3.00 x10*3/uL    Monocytes Absolute 0.64 0.05 - 0.80 x10*3/uL    Eosinophils Absolute 0.05 0.00 - 0.40 x10*3/uL    Basophils Absolute 0.03 0.00 - 0.10 x10*3/uL   Comprehensive Metabolic Panel    Collection Time: 02/06/25  7:47 AM   Result Value Ref Range    Glucose 117 (H) 74 - 99 mg/dL    Sodium 136 136 - 145 mmol/L    Potassium 3.6 3.5 - 5.3 mmol/L    Chloride 104 98 - 107 mmol/L    Bicarbonate 24 21 - 32 mmol/L    Anion Gap 12 10 - 20 mmol/L    Urea Nitrogen 14 6 - 23 mg/dL    Creatinine 0.69 0.50 - 1.05 mg/dL    eGFR 88 >60 mL/min/1.73m*2    Calcium 9.3 8.6 - 10.3 mg/dL    Albumin 4.3 3.4 - 5.0 g/dL    Alkaline Phosphatase 52 33 - 136 U/L    Total Protein 6.8 6.4 - 8.2 g/dL    AST 19 9 - 39 U/L    Bilirubin, Total 0.4 0.0 - 1.2 mg/dL    ALT 14 7 - 45 U/L   Acute Toxicology Panel, Blood    Collection Time: 02/06/25  7:47 AM   Result Value Ref Range    Acetaminophen <10.0 10.0 - 30.0 ug/mL    Salicylate  <3 4 - 20 mg/dL    Alcohol <10 <=10 mg/dL   SST TOP    Collection Time: 02/06/25  7:51 AM   Result Value Ref Range    Extra Tube Hold for add-ons.    Drug Screen, Urine    Collection Time: 02/06/25  9:16 AM   Result Value Ref Range    Amphetamine Screen, Urine Presumptive  Negative Presumptive Negative    Barbiturate Screen, Urine Presumptive Negative Presumptive Negative    Benzodiazepines Screen, Urine Presumptive Negative Presumptive Negative    Cannabinoid Screen, Urine Presumptive Negative Presumptive Negative    Cocaine Metabolite Screen, Urine Presumptive Negative Presumptive Negative    Fentanyl Screen, Urine Presumptive Negative Presumptive Negative    Opiate Screen, Urine Presumptive Negative Presumptive Negative    Oxycodone Screen, Urine Presumptive Negative Presumptive Negative    PCP Screen, Urine Presumptive Negative Presumptive Negative    Methadone Screen, Urine Presumptive Negative Presumptive Negative   Urinalysis with Reflex Culture and Microscopic    Collection Time: 02/06/25  9:17 AM   Result Value Ref Range    Color, Urine Light-Yellow Light-Yellow, Yellow, Dark-Yellow    Appearance, Urine Clear Clear    Specific Gravity, Urine 1.008 1.005 - 1.035    pH, Urine 7.5 5.0, 5.5, 6.0, 6.5, 7.0, 7.5, 8.0    Protein, Urine NEGATIVE NEGATIVE, 10 (TRACE), 20 (TRACE) mg/dL    Glucose, Urine Normal Normal mg/dL    Blood, Urine NEGATIVE NEGATIVE mg/dL    Ketones, Urine NEGATIVE NEGATIVE mg/dL    Bilirubin, Urine NEGATIVE NEGATIVE mg/dL    Urobilinogen, Urine Normal Normal mg/dL    Nitrite, Urine NEGATIVE NEGATIVE    Leukocyte Esterase, Urine NEGATIVE NEGATIVE     Radiographs:  CT head wo IV contrast   Final Result   The white matter has developed patchy areas of hypodensity more so on   the right frontal region since the 2011 CT which may be secondary to   chronic microvascular ischemic changes. No large acute infarct is   noted. No hemorrhage or mass is noted.             MACRO:   None        Signed by: Christopher Becerril 2/6/2025 8:48 AM   Dictation workstation:   [id number]          Procedures:   Procedures     EKG: Independently reviewed by this provider at 739  Indication: Medical clearance  Rate: 83  Rhythm: Normal sinus rhythm  Interval: Normal  Axis: Normal  ST Segment:  "No ST elevation    Assessment   In brief, Yumiko Richter is a 79 y.o. female who presented to the emergency department for audio and visual hallucinations, paranoia getting progressively worse over the last week    Plan   Medical clearance and EPAT evaluation    Differentials   Psychosis  Delirium  Alzheimer's    ED Course     ED Course as of 02/06/25 1506   Thu Feb 06, 2025 0741 Discussed with TON, patient with gradual onset of worsening hallucinations.  Has not had a neurologic workup yet.  PCP ordered an MRI that has not been yet completed.  Outpatient labs did not reveal an underlying cause.  Need to consider structural neurologic diagnoses, really no evidence of infectious process or encephalitis.  Patient requires brain imaging due to new onset psychosis.  Likely geriatric psychiatry admission. [JW]   0814 EKG shows normal sinus rhythm with a heart rate of 83 bpm, normal intervals normal axis and no hypertrophy.  There is no STEMI on this EKG. [JW]   1007 Medically nonemergent at this time [JW]   1409 Calm and cooperative. Tolerated seroquel 12.5 mg well, not somnolent but improved mood. Workup unremarkable. EPAT told family that she will be going to [facility]. [JW]      ED Course User Index  [JW] Yany Christine MD         Diagnoses as of 02/06/25 1506   Dementia with behavioral disturbance (Multi)   Acute psychosis (Multi)       Visit Vitals  /78 (BP Location: Right arm, Patient Position: Sitting)   Pulse 89   Temp 36.5 °C (97.7 °F) (Tympanic)   Resp 14   Ht 1.6 m (5' 3\") Comment: EMR   Wt 55.3 kg (122 lb) Comment: EMR   SpO2 98%   BMI 21.61 kg/m²   OB Status Postmenopausal   Smoking Status Never   BSA 1.57 m²       Medications   QUEtiapine (SEROquel) tablet 12.5 mg (12.5 mg oral Given 2/6/25 1235)       Plan of care discussed, patient is stable appearing, in no distress, calm, cooperative, able to give some history,  is at the bedside and able to provide additional history.  Lab work " obtained and reviewed and patient is medically clear, CT scan does show some white matter has developed patchy areas of hypodensity more so on the right frontal region since the 2011 CT scan.  Was seen by emergency psychiatric assessment team who recommend inpatient, pending placement, family and patient are agreeable for placement.  Signing out to ED resident for location of final placement, pending EPAT to place patient    Final Impression      1. Dementia with behavioral disturbance (Multi)    2. Acute psychosis (Multi)          DISPOSITION  Disposition: Transfer to outside hospital  Patient condition is stable    Comment: Please note this report has been produced using speech recognition software and may contain errors related to that system including errors in grammar, punctuation, and spelling, as well as words and phrases that may be inappropriate.  If there are any questions or concerns please feel free to contact the dictating provider for clarification.    EDGARDO Yung-JACOBO Bonilla  02/06/25 1503       EDGARDO Yung-PINA  02/06/25 1509

## 2025-02-06 NOTE — PROGRESS NOTES
EPAT - Social Work Psychiatric Assessment    Arrival Details  Mode of Arrival: Ambulatory  Admission Source: Home  Admission Type: Involuntary  EPAT Assessment Start Date: 02/06/25  EPAT Assessment Start Time: 1200  Name of : Be Bolden    History of Present Illness  Admission Reason: Delusions/Hallucinations  HPI: Patient is a 79 year old female who presented to the ED with her family The patient reports hearing voices telling her they are going to hurt her. She has been hearing voices since July 2024. She is not sleeping. She is also seeing people in her home that are stealing from her and threatening her. She denies any SI/HI. A review of her Triage, Provider and [place] was conducted.     Readmission Information   Readmission within 30 Days: No    Psychiatric Symptoms  Anxiety Symptoms: Generalized  Depression Symptoms: Sleep disturbance  [place] Symptoms: Less need to sleep    Psychosis Symptoms  Hallucination Type: Auditory, Command, Visual, Voices commenting  Delusion Type: Paranoid    Additional Symptoms - Adult  Generalized Anxiety Disorder: Excessive anxiety/worry  Obsessive Compulsive Disorder: No problems reported or observed.  Panic Attack: No problems reported or observed.  Post Traumatic Stress Disorder: No problems reported or observed.  Delirium: No problems reported or observed.  Review of Symptoms Comments: Please see above    Past Psychiatric History/Meds/Treatments  Past Psychiatric History: Patient has no Psychiatric History. Her family has an intake with a Psychiatrist in March 2025 and a Nuerology appt for July 2025. She does take medications prescribed by her PCP. She has no history of substance use or self harm.  Past Psychiatric Meds/Treatments: See med list. Patient is compliant  Past Violence/Victimization History: none    Current Mental Health Contacts   Name/Phone Number: none   Last Appointment Date: none  Provider Name/Phone Number: /PCP  Provider  Last Appointment Date: n/a    Support System: Extended family, Immediate family, Community    Living Arrangement: House    Home Safety  Feels Safe Living in Home: No         [place] Service/Education History  Current or Previous  Service: None  Education Level: Other (Comment)  History of Learning Problems: No  History of School Behavior Problems: No  School History: see above    Social/Cultural History  Social History: Patient is her own guardian. her son is her POA. Recent stressor are the death of her Nephew in December 2024 and her daughter in law has stage 4 cancer.  Important Activities: Family    Legal  Legal Concerns: none    Drug Screening  Have you used any substances (canabis, cocaine, heroin, hallucinogens, inhalants, etc.) in the past 12 months?: No  Have you used any prescription drugs other than prescribed in the past 12 months?: No  Is a toxicology screen needed?: No         Behavioral Health  Behavioral Health(WDL): Exceptions to WDL  Behaviors/Mood: Anxious, Cooperative, Delusions, Hallucinations  Affect: Appropriate to circumstances  Parent/Guardian/Significant Other Involvement: Attentive to patient needs  Family Behaviors: Appropriate for situation  Visitor Behaviors: Appropriate for situation    Orientation  Orientation Level: Oriented X4    General Appearance  Motor Activity: Unremarkable  Speech Pattern: Other (Comment)  General Attitude: Cooperative, Suspicious  Appearance/Hygiene: Unremarkable    Thought Process  Coherency: Flight of ideas  Content: Delusions, Preoccupation  Delusions: Paranoid  Perception: Hallucinations  Hallucination: Auditory, Command, Visual  Judgment/Insight: Limited  Confusion: Mild  Cognition: Follows commands    Sleep Pattern  Sleep Pattern: Difficulty falling asleep    Risk Factors  Self Harm/Suicidal Ideation Plan: Patient denies  Previous Self Harm/Suicidal Plans: none  Risk Factors: None    Violence Risk Assessment  Assessment of Violence: None  noted  Thoughts of Harm to Others: No    Ability to Assess Risk Screen  Risk Screen - Ability to Assess: Able to be screened  Ask Suicide-Screening Questions  1. In the past few weeks, have you wished you were dead?: No  2. In the past few weeks, have you felt that you or your family would be better off if you were dead?: No  3. In the past week, have you been having thoughts about killing yourself?: No  4. Have you ever tried to kill yourself?: No  5. Are you having thoughts of killing yourself right now?: No  Calculated Risk Score: No intervention is necessary  [place] Suicide Severity Rating Scale (Screener/Recent Self-Report)  1. Wish to be Dead (Past 1 Month): No  2. Non-Specific Active Suicidal Thoughts (Past 1 Month): No  6. Suicidal Behavior (Lifetime): No  Calculated C-SSRS Risk Score (Lifetime/Recent): No Risk Indicated  Step 1: Risk Factors  Current & Past Psychiatric Dx: Mood disorder, Psychotic disorder  Presenting Symptoms: Psychosis  Family History: Other (Comment)  Precipitants/Stressors: Triggering events leading to humiliation, shame, and/or despair (e.g. loss of relationship, financial or health status) (real or anticipated)  Change in Treatment: Non-compliant or not receiving treatment  Access to Lethal Methods : No  Step 2: Protective Factors   Protective Factors Internal: Identifies reasons for living  Protective Factors External: Cultural, spiritual and/or moral attitudes against suicide, Responsibility to children, Supportive social network or family or friends  Step 3: Suicidal Ideation Intensity  How Many Times Have You Had These Thoughts: Less than once a week  When You Have the Thoughts How Long do They Last : Fleeting - few seconds or minutes  Could/Can You Stop Thinking About Killing Yourself or Wanting to Die if You Want to: Does not attempt to control thoughts  Are There Things - Anyone or Anything - That Stopped You From Wanting to Die or Acting on: Does not apply  What Sort of  "Reasons Did You Have For Thinking About Wanting to Die or Killing Yourself: Does not apply  Total Score: 2  Step 5: Documentation  Risk Level: Low suicide risk (Patient is low risk. Dr. Christine agrees.)    Psychiatric Impression and Plan of Care  Assessment and Plan: Patient is a 79 year old female brought to the ED by her family concerned with visual and auditory hallucinations over the past few months. The patient reports hearing command hallucinations to \"run and hide\". she states the voices are telling her she will be hurt. During the assessment she continued to endorse these voices and was tell her family to call someone to \"make them go away\". The patient has not been sleeping. She has no known history of mental health. She also reports \"people in my home stealing and threatening me\". She states she is afraid to be at home. Her  and daughter in law shared she spend the night under a table in the basement because \"the voices told me to\". She states that she cannot sleep because of the voices in her head. She reports the voices began on \"july 31st 2025\" and they have not left. She denies any homicidal or suicidal ideation. She has no history of inpatient stays. She has a scheduled psychiatric appointment for March 2025. Due to the patient's hallucinations, Delusions, Paranoia she will be referred inpatient.  Specific Resources Provided to Patient: none  CM Notified: no  PHP/IOP Recommended: none  Specific Information Provided for PHP/IOP: none  Plan Comments: inpatient    Outcome/Disposition  Patient's Perception of Outcome Achieved: patient and family are in agreement with inpatient care  Assessment, Recommendations and Risk Level Reviewed with: inpatient  Contact Name: Thomas Richter  Contact Number(s): [phone]  Contact Relationship: spouse  EPAT Assessment Completed Date: 02/06/25  EPAT Assessment Completed Time: 1248      "

## 2025-02-10 ENCOUNTER — APPOINTMENT (OUTPATIENT)
Dept: RADIOLOGY | Facility: HOSPITAL | Age: 80
End: 2025-02-10
Payer: MEDICARE

## 2025-02-22 LAB
ATRIAL RATE: 83 BPM
P AXIS: 75 DEGREES
P OFFSET: 191 MS
P ONSET: 138 MS
PR INTERVAL: 166 MS
Q ONSET: 221 MS
QRS COUNT: 14 BEATS
QRS DURATION: 78 MS
QT INTERVAL: 358 MS
QTC CALCULATION(BAZETT): 420 MS
QTC FREDERICIA: 399 MS
R AXIS: 79 DEGREES
T AXIS: 75 DEGREES
T OFFSET: 400 MS
VENTRICULAR RATE: 83 BPM

## 2025-02-27 ENCOUNTER — APPOINTMENT (OUTPATIENT)
Dept: BEHAVIORAL HEALTH | Facility: CLINIC | Age: 80
End: 2025-02-27
Payer: MEDICARE

## 2025-03-06 LAB
25(OH)D3+25(OH)D2 SERPL-MCNC: 38 NG/ML (ref 30–100)
ALBUMIN SERPL-MCNC: 4 G/DL (ref 3.6–5.1)
ALP SERPL-CCNC: 60 U/L (ref 37–153)
ALT SERPL-CCNC: 24 U/L (ref 6–29)
ANION GAP SERPL CALCULATED.4IONS-SCNC: 7 MMOL/L (CALC) (ref 7–17)
AST SERPL-CCNC: 28 U/L (ref 10–35)
BASOPHILS # BLD AUTO: 29 CELLS/UL (ref 0–200)
BASOPHILS NFR BLD AUTO: 0.5 %
BILIRUB SERPL-MCNC: 0.4 MG/DL (ref 0.2–1.2)
BUN SERPL-MCNC: 27 MG/DL (ref 7–25)
CALCIUM SERPL-MCNC: 9.3 MG/DL (ref 8.6–10.4)
CHLORIDE SERPL-SCNC: 103 MMOL/L (ref 98–110)
CHOLEST SERPL-MCNC: 209 MG/DL
CHOLEST/HDLC SERPL: 3.3 (CALC)
CO2 SERPL-SCNC: 27 MMOL/L (ref 20–32)
CREAT SERPL-MCNC: 0.82 MG/DL (ref 0.6–0.95)
EGFRCR SERPLBLD CKD-EPI 2021: 72 ML/MIN/1.73M2
EOSINOPHIL # BLD AUTO: 348 CELLS/UL (ref 15–500)
EOSINOPHIL NFR BLD AUTO: 6 %
ERYTHROCYTE [DISTWIDTH] IN BLOOD BY AUTOMATED COUNT: 13.6 % (ref 11–15)
GLUCOSE SERPL-MCNC: 90 MG/DL (ref 65–99)
HCT VFR BLD AUTO: 37.8 % (ref 35–45)
HDLC SERPL-MCNC: 63 MG/DL
HGB BLD-MCNC: 12.4 G/DL (ref 11.7–15.5)
LDLC SERPL CALC-MCNC: 121 MG/DL (CALC)
LYMPHOCYTES # BLD AUTO: 1299 CELLS/UL (ref 850–3900)
LYMPHOCYTES NFR BLD AUTO: 22.4 %
MAGNESIUM SERPL-MCNC: 2.2 MG/DL (ref 1.5–2.5)
MCH RBC QN AUTO: 31.7 PG (ref 27–33)
MCHC RBC AUTO-ENTMCNC: 32.8 G/DL (ref 32–36)
MCV RBC AUTO: 96.7 FL (ref 80–100)
MONOCYTES # BLD AUTO: 748 CELLS/UL (ref 200–950)
MONOCYTES NFR BLD AUTO: 12.9 %
NEUTROPHILS # BLD AUTO: 3376 CELLS/UL (ref 1500–7800)
NEUTROPHILS NFR BLD AUTO: 58.2 %
NONHDLC SERPL-MCNC: 146 MG/DL (CALC)
PLATELET # BLD AUTO: 244 THOUSAND/UL (ref 140–400)
PMV BLD REES-ECKER: 10.7 FL (ref 7.5–12.5)
POTASSIUM SERPL-SCNC: 4.3 MMOL/L (ref 3.5–5.3)
PROT SERPL-MCNC: 6.4 G/DL (ref 6.1–8.1)
RBC # BLD AUTO: 3.91 MILLION/UL (ref 3.8–5.1)
SODIUM SERPL-SCNC: 137 MMOL/L (ref 135–146)
T4 FREE SERPL-MCNC: 1.3 NG/DL (ref 0.8–1.8)
TRIGL SERPL-MCNC: 136 MG/DL
TSH SERPL-ACNC: 5.23 MIU/L (ref 0.4–4.5)
VIT B12 SERPL-MCNC: 328 PG/ML (ref 200–1100)
WBC # BLD AUTO: 5.8 THOUSAND/UL (ref 3.8–10.8)

## 2025-03-10 ENCOUNTER — APPOINTMENT (OUTPATIENT)
Dept: PRIMARY CARE | Facility: CLINIC | Age: 80
End: 2025-03-10
Payer: MEDICARE

## 2025-03-10 VITALS
RESPIRATION RATE: 16 BRPM | HEART RATE: 71 BPM | OXYGEN SATURATION: 97 % | WEIGHT: 129 LBS | SYSTOLIC BLOOD PRESSURE: 110 MMHG | DIASTOLIC BLOOD PRESSURE: 60 MMHG | HEIGHT: 63 IN | TEMPERATURE: 97.2 F | BODY MASS INDEX: 22.86 KG/M2

## 2025-03-10 DIAGNOSIS — E53.8 VITAMIN B12 DEFICIENCY: ICD-10-CM

## 2025-03-10 DIAGNOSIS — E55.9 VITAMIN D DEFICIENCY: ICD-10-CM

## 2025-03-10 DIAGNOSIS — M81.0 AGE-RELATED OSTEOPOROSIS WITHOUT CURRENT PATHOLOGICAL FRACTURE: ICD-10-CM

## 2025-03-10 DIAGNOSIS — R44.0 SEVERE AUDITORY HALLUCINATIONS: ICD-10-CM

## 2025-03-10 DIAGNOSIS — E78.5 DYSLIPIDEMIA: ICD-10-CM

## 2025-03-10 DIAGNOSIS — Z12.31 SCREENING MAMMOGRAM FOR BREAST CANCER: ICD-10-CM

## 2025-03-10 DIAGNOSIS — Z13.820 SCREENING FOR OSTEOPOROSIS: Primary | ICD-10-CM

## 2025-03-10 DIAGNOSIS — Z00.00 MEDICARE ANNUAL WELLNESS VISIT, SUBSEQUENT: ICD-10-CM

## 2025-03-10 DIAGNOSIS — G47.09 OTHER INSOMNIA: ICD-10-CM

## 2025-03-10 DIAGNOSIS — E03.9 ACQUIRED HYPOTHYROIDISM: ICD-10-CM

## 2025-03-10 DIAGNOSIS — F01.52 VASCULAR DEMENTIA WITH PSYCHOTIC DISTURBANCE, UNSPECIFIED DEMENTIA SEVERITY: ICD-10-CM

## 2025-03-10 PROBLEM — R41.9 NEUROCOGNITIVE DISORDER: Status: ACTIVE | Noted: 2025-02-25

## 2025-03-10 PROBLEM — F29 PSYCHOSIS (MULTI): Status: ACTIVE | Noted: 2025-02-25

## 2025-03-10 PROCEDURE — 1159F MED LIST DOCD IN RCRD: CPT | Performed by: INTERNAL MEDICINE

## 2025-03-10 PROCEDURE — 1170F FXNL STATUS ASSESSED: CPT | Performed by: INTERNAL MEDICINE

## 2025-03-10 PROCEDURE — G0439 PPPS, SUBSEQ VISIT: HCPCS | Performed by: INTERNAL MEDICINE

## 2025-03-10 PROCEDURE — 1158F ADVNC CARE PLAN TLK DOCD: CPT | Performed by: INTERNAL MEDICINE

## 2025-03-10 PROCEDURE — 1123F ACP DISCUSS/DSCN MKR DOCD: CPT | Performed by: INTERNAL MEDICINE

## 2025-03-10 PROCEDURE — 99214 OFFICE O/P EST MOD 30 MIN: CPT | Performed by: INTERNAL MEDICINE

## 2025-03-10 PROCEDURE — 1160F RVW MEDS BY RX/DR IN RCRD: CPT | Performed by: INTERNAL MEDICINE

## 2025-03-10 PROCEDURE — 1157F ADVNC CARE PLAN IN RCRD: CPT | Performed by: INTERNAL MEDICINE

## 2025-03-10 PROCEDURE — 1036F TOBACCO NON-USER: CPT | Performed by: INTERNAL MEDICINE

## 2025-03-10 RX ORDER — OLANZAPINE 10 MG/1
10 TABLET ORAL NIGHTLY
Qty: 90 TABLET | Refills: 1 | Status: SHIPPED | OUTPATIENT
Start: 2025-03-10

## 2025-03-10 RX ORDER — OLANZAPINE 10 MG/1
10 TABLET ORAL
COMMUNITY
Start: 2025-02-25 | End: 2025-03-10 | Stop reason: SDUPTHER

## 2025-03-10 RX ORDER — LEVOTHYROXINE SODIUM 100 UG/1
100 TABLET ORAL DAILY
Qty: 90 TABLET | Refills: 1 | Status: SHIPPED | OUTPATIENT
Start: 2025-03-10

## 2025-03-10 RX ORDER — OLANZAPINE 2.5 MG/1
2.5 TABLET ORAL
COMMUNITY
Start: 2025-02-25 | End: 2025-03-10 | Stop reason: SDUPTHER

## 2025-03-10 RX ORDER — OLANZAPINE 2.5 MG/1
2.5 TABLET ORAL NIGHTLY
Qty: 90 TABLET | Refills: 1 | Status: SHIPPED | OUTPATIENT
Start: 2025-03-10

## 2025-03-10 RX ORDER — TRAZODONE HYDROCHLORIDE 50 MG/1
50 TABLET ORAL NIGHTLY PRN
Qty: 30 TABLET | Refills: 3 | Status: SHIPPED | OUTPATIENT
Start: 2025-03-10 | End: 2026-03-10

## 2025-03-10 ASSESSMENT — ENCOUNTER SYMPTOMS
ADENOPATHY: 0
CONSTIPATION: 0
SORE THROAT: 0
CHILLS: 0
FATIGUE: 0
DYSURIA: 0
NAUSEA: 0
NECK PAIN: 1
WHEEZING: 0
CHEST TIGHTNESS: 0
CONFUSION: 0
SLEEP DISTURBANCE: 0
VOMITING: 0
ABDOMINAL PAIN: 0
UNEXPECTED WEIGHT CHANGE: 0
DIARRHEA: 0
BACK PAIN: 1
COUGH: 0
PALPITATIONS: 0
JOINT SWELLING: 0
SHORTNESS OF BREATH: 0
ARTHRALGIAS: 1
WEAKNESS: 0
DIZZINESS: 0

## 2025-03-10 ASSESSMENT — ACTIVITIES OF DAILY LIVING (ADL)
MANAGING_FINANCES: INDEPENDENT
BATHING: INDEPENDENT
DRESSING: INDEPENDENT
TAKING_MEDICATION: INDEPENDENT
DOING_HOUSEWORK: INDEPENDENT
GROCERY_SHOPPING: INDEPENDENT

## 2025-03-10 NOTE — ASSESSMENT & PLAN NOTE
Orders:    traZODone (Desyrel) 50 mg tablet; Take 1 tablet (50 mg) by mouth as needed at bedtime for sleep.

## 2025-03-10 NOTE — PROGRESS NOTES
Subjective   Reason for Visit: Yumiko Richter is an 79 y.o. female here for a Medicare Wellness visit.     Past Medical, Surgical, and Family History reviewed and updated in chart.    Reviewed all medications by prescribing practitioner or clinical pharmacist (such as prescriptions, OTCs, herbal therapies and supplements) and documented in the medical record.    AWV, hospital fu, medication rev  Never  a smoker  Labs- 2.2025  LABS - 3.2025  COLONOSCOPY-[id number]  Mammogram - 1.15.2025  DEXA-12.4.2023     here today  with patient  for appointment patient  came home  from hospital  1 week ago  from [facility] , [facility]  and from there was transfer  to [facility]  for  5 days , now feels good , memory god .   Was admitted for auditory hallucination for few month  , getting worse for the last month, was started as outpt on seroquel and increased in hospital but didn't controll well the symptoms, now she is on olanzapine 12.5 mg. Now doesn't have any more scary hallucination , hearing music sometimes.  Has very good memory, able to understand her condition as well as need to take her medication regular to keep her stable.   Also , is asking regarding driving , she just needs to drive only around the house.   In my opinion she has the capacity to make sound decision, respondeat well to all my question.  Hospital records, labs and test reviewed, discharge instruction and medication discussed with pt.  ]          Patient Care Team:  Syed Trinh MD as PCP - General     Review of Systems   Constitutional:  Negative for chills, fatigue and unexpected weight change.        Comment   HENT:  Negative for congestion, ear pain and sore throat.    Respiratory:  Negative for cough, chest tightness, shortness of breath and wheezing.    Cardiovascular:  Negative for palpitations and leg swelling.   Gastrointestinal:  Negative for abdominal pain, constipation, diarrhea,  "nausea and vomiting.   Genitourinary:  Negative for dysuria and urgency.   Musculoskeletal:  Positive for arthralgias, back pain and neck pain. Negative for joint swelling.        L tumb    Skin:  Negative for rash.   Neurological:  Negative for dizziness and weakness.   Hematological:  Negative for adenopathy.   Psychiatric/Behavioral:  Negative for confusion and sleep disturbance.    All other systems reviewed and are negative.      Objective   Vitals:  /60 (BP Location: Left arm, Patient Position: Sitting)   Pulse 71   Temp 36.2 °C (97.2 °F)   Resp 16   Ht 1.6 m (5' 3\")   Wt 58.5 kg (129 lb)   SpO2 97%   BMI 22.85 kg/m²       Physical Exam  Constitutional:       Appearance: Normal appearance.   HENT:      Head: Normocephalic and atraumatic.   Eyes:      Pupils: Pupils are equal, round, and reactive to light.   Cardiovascular:      Rate and Rhythm: Normal rate and regular rhythm.   Pulmonary:      Effort: Pulmonary effort is normal.      Breath sounds: Normal breath sounds.   Musculoskeletal:         General: Normal range of motion.      Cervical back: Normal range of motion and neck supple.   Skin:     General: Skin is warm.   Neurological:      General: No focal deficit present.      Mental Status: She is alert and oriented to person, place, and time.   Psychiatric:         Mood and Affect: Mood normal.         Behavior: Behavior normal.       Lab Results   Component Value Date    GLUCOSE 90 03/05/2025    CALCIUM 9.3 03/05/2025     03/05/2025    K 4.3 03/05/2025    CO2 27 03/05/2025     03/05/2025    BUN 27 (H) 03/05/2025    CREATININE 0.82 03/05/2025     Lab Results   Component Value Date    ALT 24 03/05/2025    AST 28 03/05/2025    ALKPHOS 60 03/05/2025    BILITOT 0.4 03/05/2025     Lab Results   Component Value Date    CHOL 209 (H) 03/05/2025    CHOL 177 01/30/2025    CHOL 157 02/29/2024     Lab Results   Component Value Date    HDL 63 03/05/2025    HDL 77 01/30/2025    HDL 57.2 " "02/29/2024     Lab Results   Component Value Date    LDLCALC 121 (H) 03/05/2025    LDLCALC 84 01/30/2025    LDLCALC 85 02/29/2024     Lab Results   Component Value Date    TRIG 136 03/05/2025    TRIG 74 01/30/2025    TRIG 74 02/29/2024     No components found for: \"CHOLHDL\"  Lab Results   Component Value Date    GLUCOSE 90 03/05/2025    CALCIUM 9.3 03/05/2025     03/05/2025    K 4.3 03/05/2025    CO2 27 03/05/2025     03/05/2025    BUN 27 (H) 03/05/2025    CREATININE 0.82 03/05/2025     Lab Results   Component Value Date    RAEFRTSG65 328 03/05/2025     Lab Results   Component Value Date    TSH 5.23 (H) 03/05/2025     Lab Results   Component Value Date    WBC 5.8 03/05/2025    HGB 12.4 03/05/2025    HCT 37.8 03/05/2025    MCV 96.7 03/05/2025     03/05/2025       Assessment & Plan  Screening mammogram for breast cancer         Screening for osteoporosis    Orders:    XR DEXA bone density; Future    Acquired hypothyroidism    Orders:    levothyroxine (Synthroid, Levoxyl) 100 mcg tablet; Take 1 tablet (100 mcg) by mouth once daily.    TSH with reflex to Free T4 if abnormal; Future    Age-related osteoporosis without current pathological fracture    Orders:    XR DEXA bone density; Future    Medicare annual wellness visit, subsequent         Vitamin B12 deficiency         Vitamin D deficiency         Dyslipidemia         Severe auditory hallucinations         Vascular dementia with psychotic disturbance, unspecified dementia severity    Orders:    OLANZapine (ZyPREXA) 10 mg tablet; Take 1 tablet (10 mg) by mouth once daily at bedtime.    OLANZapine (ZyPREXA) 2.5 mg tablet; Take 1 tablet (2.5 mg) by mouth once daily at bedtime.    Other insomnia    Orders:    traZODone (Desyrel) 50 mg tablet; Take 1 tablet (50 mg) by mouth as needed at bedtime for sleep.              "

## 2025-03-10 NOTE — ASSESSMENT & PLAN NOTE
Orders:    OLANZapine (ZyPREXA) 10 mg tablet; Take 1 tablet (10 mg) by mouth once daily at bedtime.    OLANZapine (ZyPREXA) 2.5 mg tablet; Take 1 tablet (2.5 mg) by mouth once daily at bedtime.

## 2025-03-10 NOTE — PATIENT INSTRUCTIONS
Was nice seeing you today.  Continue same medication.  Have lab work done before next appointment if labs were ordered today.  Fu in 3 month.  Fu with psychiatry in 1 month  Call/ contact our office with any concerns.    If you have labs or test done and you can't see the report in your chart or you didn't hear from us in 2 weeks after test/labs done , please, call our office for reports.  Please , do not assume that they were normal.    Any test results  and questions you might have , will be discussed at next visit -- please make sure to make a follow up appt after testing if reports are abnormal or you have questions.

## 2025-03-10 NOTE — ASSESSMENT & PLAN NOTE
Orders:    levothyroxine (Synthroid, Levoxyl) 100 mcg tablet; Take 1 tablet (100 mcg) by mouth once daily.    TSH with reflex to Free T4 if abnormal; Future

## 2025-03-18 ENCOUNTER — HOSPITAL ENCOUNTER (OUTPATIENT)
Dept: RADIOLOGY | Facility: HOSPITAL | Age: 80
Discharge: HOME | End: 2025-03-18
Payer: MEDICARE

## 2025-03-18 DIAGNOSIS — Z13.820 SCREENING FOR OSTEOPOROSIS: ICD-10-CM

## 2025-03-18 DIAGNOSIS — M81.0 AGE-RELATED OSTEOPOROSIS WITHOUT CURRENT PATHOLOGICAL FRACTURE: ICD-10-CM

## 2025-03-18 PROCEDURE — 77080 DXA BONE DENSITY AXIAL: CPT

## 2025-03-18 PROCEDURE — 77080 DXA BONE DENSITY AXIAL: CPT | Performed by: RADIOLOGY

## 2025-03-28 DIAGNOSIS — E03.9 ACQUIRED HYPOTHYROIDISM: Primary | ICD-10-CM

## 2025-03-31 RX ORDER — LEVOTHYROXINE SODIUM 100 UG/1
100 TABLET ORAL DAILY
Qty: 90 TABLET | Refills: 1 | Status: SHIPPED | OUTPATIENT
Start: 2025-03-31

## 2025-04-03 ENCOUNTER — APPOINTMENT (OUTPATIENT)
Dept: BEHAVIORAL HEALTH | Facility: CLINIC | Age: 80
End: 2025-04-03
Payer: MEDICARE

## 2025-04-03 DIAGNOSIS — F01.52 VASCULAR DEMENTIA WITH PSYCHOTIC DISTURBANCE, UNSPECIFIED DEMENTIA SEVERITY: Primary | ICD-10-CM

## 2025-04-03 DIAGNOSIS — R44.0 SEVERE AUDITORY HALLUCINATIONS: ICD-10-CM

## 2025-04-03 NOTE — PROGRESS NOTES
"Outpatient Psychiatry    Subjective   Yumiko Richter, a 79 y.o. female, presenting to Psychiatry for evaluation.  Patient is referred by Syed Trinh MD.      HPI:  Patient presents today with spouse, after recent geriatric psychiatry hospitalization for auditory hallucinations at [facility], initially presented to [facility] on 02/2025.   and wife report that she had been hearing \"music in her head\" since many years but no voices. In late January,  found her in the basement under a table looking for something. When  asked what she was looking for, reports pt told him she was looking for a \"microchip\". Reports he redirected her and took her back to bed, but the following night found her in the same place. Reports this occurred for about 5-6 nights. One day,  noted she was \"running from room to room\" yelling \"help\" and eventually called 911 telling them that someone was in the house. She tried calling 911 the next night, and  intervened and had her taken to the hospital.     Patient reports she first experiencing voices the night her  found her under the table. At the time, reports hearing voices telling her that people were trying to kill her and her family. She denies ever hearing voices before.  notes that she appeared internally stimulated, and appeared to be \"following directions\".  She denies any past hx of mood sx or anxiety sx. She denies any hx of panic attacks or nightmares.     Reports [place] have [place]. Have one son and 2 grandchildren. 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 She reports the move to [place] when she was 25yo from her home country, was very difficult for her, but since her marriage,  she reports no other concerns of mood, anxiety, or trauma. She denies any hx of suicidal thoughts, HI, or AVH. Mother dx with " dementia in her 80s, father passed from stroke.     Post discharge from [facility], she was transferred to [facility]. She was residing there for 1 month,  took her home for the weekend.  felt like she was doing very well, and as pt was reluctant to continue living there,  she returned back home. She is currently living at home with her .  reports she has been doing well, with no acute concerns. Has been taking Olanzapine 12.5mg at bedtime. Also prescribed trazodone 50mg PRN for sleep but reports she has not been taking it.  and wife both report she has been sleeping well since return home. She reports since discharge, she is hearing music on occasion, but has not been experiencing any AH, reports last time was while she was admitted at [facility]. Appetite is good, energy levels are stable.   Daughter in law dx with metastatic breast cancer, nephew passed from an MI around Xochitl 2024 which were a source of stress for her.   Patient and  report she has been doing well on current regimen with no acute concerns.  They deny any acute memory concerns. Report she is able to function well at home, with no concerns. She is alerted, and oriented x4. Dx with vascular dementia while admitted to [facility].     Psychiatric Review Of Systems:  Depressive Symptoms: negative  Manic Symptoms: negative  Anxiety Symptoms: Negative  Psychotic Symptoms: negative  Other Symptoms:    Current Medications:    Current Outpatient Medications:     calcium carbonate (CALCIUM 500 ORAL), Take 1 tablet by mouth 2 times a day., Disp: , Rfl:     cholecalciferol (Vitamin D3) 5,000 Units tablet, Take 1 tablet (5,000 Units) by mouth once daily. (Patient taking differently: Take 1 tablet (5,000 Units) by mouth once daily. 1000ui qd), Disp: , Rfl:     levothyroxine (Synthroid, Levoxyl) 100 mcg tablet, Take 1 tablet (100 mcg) by mouth once daily., Disp: 90 tablet, Rfl: 1    OLANZapine (ZyPREXA) 10 mg tablet, Take 1  tablet (10 mg) by mouth once daily at bedtime., Disp: 90 tablet, Rfl: 1    OLANZapine (ZyPREXA) 2.5 mg tablet, Take 1 tablet (2.5 mg) by mouth once daily at bedtime., Disp: 90 tablet, Rfl: 1    Medical History:  Past Medical History:   Diagnosis Date    Personal history of other endocrine, nutritional and metabolic disease 10/09/2019    History of hypothyroidism    Personal history of other infectious and parasitic diseases 11/03/2017    History of herpes zoster       Past Psychiatric History:   Diagnoses: denies  Previous Psychiatrist: denies  Therapy: denies denies  Current psychiatric medications: zyprexa 12.5mg PO QHS  Past psychiatric medications:denies  Past psychiatric treatments:  denies  Hospitalizations: denies  Suicide attempts: denies  Family psychiatric history: mother with dementia    Social History:   Currently lives: with  in [facility]  Education: high school  Work/Finances: retired  Marital history/children: , 1 son  Current stressors: daughter in law with metastatic breast cancer  Social support: , family  Legal History:  none   History: none  Access to Weapons:  none    Substance Use History:  Tobacco use: denies  Use of alcohol: denied  Use of caffeine: coffee less than 1 /day  Use of other substances: denies  Legal consequences of substance use: denies  Substance use disorder treatment: denies    OARRS:  Carol Sullivan MD on 4/3/2025  3:40 PM  I have personally reviewed the OARRS report for Yumiko Richter. I have considered the risks of abuse, dependence, addiction and diversion    Recent Results (from the past 8760 hours)   Drug Screen, Urine    Collection Time: 02/06/25  9:16 AM   Result Value Ref Range    Amphetamine Screen, Urine Presumptive Negative Presumptive Negative    Barbiturate Screen, Urine Presumptive Negative Presumptive Negative    Benzodiazepines Screen, Urine Presumptive Negative Presumptive Negative    Cannabinoid Screen, Urine Presumptive  "Negative Presumptive Negative    Cocaine Metabolite Screen, Urine Presumptive Negative Presumptive Negative    Fentanyl Screen, Urine Presumptive Negative Presumptive Negative    Opiate Screen, Urine Presumptive Negative Presumptive Negative    Oxycodone Screen, Urine Presumptive Negative Presumptive Negative    PCP Screen, Urine Presumptive Negative Presumptive Negative    Methadone Screen, Urine Presumptive Negative Presumptive Negative         Objective   Mental Status Exam  Appearance: well groomed, appears stated age  Attitude: Calm, cooperative, and engaged in conversation.  Behavior: Appropriate eye contact.   Motor Activity: No psychomotor agitation or retardation. No abnormal movements, tremors or tics. No evidence of extrapyramidal symptoms or tardive dyskinesia.  Speech: Regular rate, rhythm, volume. Spontaneous, no pressured speech.  Mood: \"good!\"  Affect: Euthymic, full range, mood congruent.  Thought Process: Linear, logical, and goal-directed. No loose associations or gross thought disorganization.  Thought Content: Denied current suicidal ideation or thoughts of harm to self, denied homicidal ideation or thoughts of harm to others. No delusional thinking elicited. No perseverations or obsessions identified.   Perception: Did not endorse auditory or visual hallucinations, did not appear to be responding to hallucinatory stimuli.   Cognition: Alert, oriented x3. Preserved attention span and concentration, recent and remote memory. Adequate fund of knowledge. No deficits in language.   Insight: Fair, in regards to understanding mental health condition  Judgement: Fair      Vitals:  There were no vitals filed for this visit.    Other Objective Information:  Orders Only on 03/05/2025   Component Date Value Ref Range Status    CHOLESTEROL, TOTAL 03/05/2025 209 (H)  <200 mg/dL Final    HDL CHOLESTEROL 03/05/2025 63  > OR = 50 mg/dL Final    TRIGLYCERIDES 03/05/2025 136  <150 mg/dL Final    LDL-CHOLESTEROL " 03/05/2025 121 (H)  mg/dL (calc) Final    Comment: Reference range: <100     Desirable range <100 mg/dL for primary prevention;    <70 mg/dL for patients with CHD or diabetic patients   with > or = 2 CHD risk factors.     LDL-C is now calculated using the Hafsa   calculation, which is a validated novel method providing   better accuracy than the Friedewald equation in the   estimation of LDL-C.   Shane GALINDO et al. ADELINA. 2013;310(19): 1909-5563   (http://education.DinnDinn/faq/DXV620)      CHOL/HDLC RATIO 03/05/2025 3.3  <5.0 (calc) Final    NON HDL CHOLESTEROL 03/05/2025 146 (H)  <130 mg/dL (calc) Final    Comment: For patients with diabetes plus 1 major ASCVD risk   factor, treating to a non-HDL-C goal of <100 mg/dL   (LDL-C of <70 mg/dL) is considered a therapeutic   option.      MAGNESIUM 03/05/2025 2.2  1.5 - 2.5 mg/dL Final    GLUCOSE 03/05/2025 90  65 - 99 mg/dL Final    Comment:               Fasting reference interval         UREA NITROGEN (BUN) 03/05/2025 27 (H)  7 - 25 mg/dL Final    CREATININE 03/05/2025 0.82  0.60 - 0.95 mg/dL Final    EGFR 03/05/2025 72  > OR = 60 mL/min/1.73m2 Final    SODIUM 03/05/2025 137  135 - 146 mmol/L Final    POTASSIUM 03/05/2025 4.3  3.5 - 5.3 mmol/L Final    CHLORIDE 03/05/2025 103  98 - 110 mmol/L Final    CARBON DIOXIDE 03/05/2025 27  20 - 32 mmol/L Final    ELECTROLYTE BALANCE 03/05/2025 7  7 - 17 mmol/L (calc) Final    CALCIUM 03/05/2025 9.3  8.6 - 10.4 mg/dL Final    PROTEIN, TOTAL 03/05/2025 6.4  6.1 - 8.1 g/dL Final    ALBUMIN 03/05/2025 4.0  3.6 - 5.1 g/dL Final    BILIRUBIN, TOTAL 03/05/2025 0.4  0.2 - 1.2 mg/dL Final    ALKALINE PHOSPHATASE 03/05/2025 60  37 - 153 U/L Final    AST 03/05/2025 28  10 - 35 U/L Final    ALT 03/05/2025 24  6 - 29 U/L Final    WHITE BLOOD CELL COUNT 03/05/2025 5.8  3.8 - 10.8 Thousand/uL Final    RED BLOOD CELL COUNT 03/05/2025 3.91  3.80 - 5.10 Million/uL Final    HEMOGLOBIN 03/05/2025 12.4  11.7 - 15.5 g/dL Final     HEMATOCRIT 03/05/2025 37.8  35.0 - 45.0 % Final    MCV 03/05/2025 96.7  80.0 - 100.0 fL Final    MCH 03/05/2025 31.7  27.0 - 33.0 pg Final    MCHC 03/05/2025 32.8  32.0 - 36.0 g/dL Final    Comment: For adults, a slight decrease in the calculated MCHC  value (in the range of 30 to 32 g/dL) is most likely  not clinically significant; however, it should be  interpreted with caution in correlation with other  red cell parameters and the patient's clinical  condition.      RDW 03/05/2025 13.6  11.0 - 15.0 % Final    PLATELET COUNT 03/05/2025 244  140 - 400 Thousand/uL Final    MPV 03/05/2025 10.7  7.5 - 12.5 fL Final    ABSOLUTE NEUTROPHILS 03/05/2025 3,376  1,500 - 7,800 cells/uL Final    ABSOLUTE LYMPHOCYTES 03/05/2025 1,299  850 - 3,900 cells/uL Final    ABSOLUTE MONOCYTES 03/05/2025 748  200 - 950 cells/uL Final    ABSOLUTE EOSINOPHILS 03/05/2025 348  15 - 500 cells/uL Final    ABSOLUTE BASOPHILS 03/05/2025 29  0 - 200 cells/uL Final    NEUTROPHILS 03/05/2025 58.2  % Final    LYMPHOCYTES 03/05/2025 22.4  % Final    MONOCYTES 03/05/2025 12.9  % Final    EOSINOPHILS 03/05/2025 6.0  % Final    BASOPHILS 03/05/2025 0.5  % Final    VITAMIN B12 03/05/2025 328  200 - 1,100 pg/mL Final    Comment:    Please Note: Although the reference range for vitamin  B12 is 200-1100 pg/mL, it has been reported that between  5 and 10% of patients with values between 200 and 400  pg/mL may experience neuropsychiatric and hematologic  abnormalities due to occult B12 deficiency; less than 1%  of patients with values above 400 pg/mL will have symptoms.         TSH W/REFLEX TO FT4 03/05/2025 5.23 (H)  0.40 - 4.50 mIU/L Final    T4, FREE 03/05/2025 1.3  0.8 - 1.8 ng/dL Final    VITAMIN D,25-OH,TOTAL,IA 03/05/2025 38  30 - 100 ng/mL Final    Comment: Vitamin D Status         25-OH Vitamin D:     Deficiency:                    <20 ng/mL  Insufficiency:             20 - 29 ng/mL  Optimal:                 > or = 30 ng/mL     For 25-OH Vitamin D  testing on patients on   D2-supplementation and patients for whom quantitation   of D2 and D3 fractions is required, the QuestAssureD(TM)  25-OH VIT D, (D2,D3), LC/MS/MS is recommended: order   code 38752 (patients >2yrs).     See Note 1     Note 1     For additional information, please refer to   http://education.VONTRAVEL/faq/ZCX989   (This link is being provided for informational/  educational purposes only.)     Admission on 02/06/2025, Discharged on 02/06/2025   Component Date Value Ref Range Status    WBC 02/06/2025 5.7  4.4 - 11.3 x10*3/uL Final    nRBC 02/06/2025 0.0  0.0 - 0.0 /100 WBCs Final    RBC 02/06/2025 4.16  4.00 - 5.20 x10*6/uL Final    Hemoglobin 02/06/2025 12.7  12.0 - 16.0 g/dL Final    Hematocrit 02/06/2025 38.3  36.0 - 46.0 % Final    MCV 02/06/2025 92  80 - 100 fL Final    MCH 02/06/2025 30.5  26.0 - 34.0 pg Final    MCHC 02/06/2025 33.2  32.0 - 36.0 g/dL Final    RDW 02/06/2025 12.5  11.5 - 14.5 % Final    Platelets 02/06/2025 181  150 - 450 x10*3/uL Final    Neutrophils % 02/06/2025 73.7  40.0 - 80.0 % Final    Immature Granulocytes %, Automated 02/06/2025 0.4  0.0 - 0.9 % Final    Immature Granulocyte Count (IG) includes promyelocytes, myelocytes and metamyelocytes but does not include bands. Percent differential counts (%) should be interpreted in the context of the absolute cell counts (cells/UL).    Lymphocytes % 02/06/2025 13.2  13.0 - 44.0 % Final    Monocytes % 02/06/2025 11.3  2.0 - 10.0 % Final    Eosinophils % 02/06/2025 0.9  0.0 - 6.0 % Final    Basophils % 02/06/2025 0.5  0.0 - 2.0 % Final    Neutrophils Absolute 02/06/2025 4.18  1.60 - 5.50 x10*3/uL Final    Percent differential counts (%) should be interpreted in the context of the absolute cell counts (cells/uL).    Immature Granulocytes Absolute, Au* 02/06/2025 0.02  0.00 - 0.50 x10*3/uL Final    Lymphocytes Absolute 02/06/2025 0.75 (L)  0.80 - 3.00 x10*3/uL Final    Monocytes Absolute 02/06/2025 0.64  0.05 - 0.80  x10*3/uL Final    Eosinophils Absolute 02/06/2025 0.05  0.00 - 0.40 x10*3/uL Final    Basophils Absolute 02/06/2025 0.03  0.00 - 0.10 x10*3/uL Final    Glucose 02/06/2025 117 (H)  74 - 99 mg/dL Final    Sodium 02/06/2025 136  136 - 145 mmol/L Final    Potassium 02/06/2025 3.6  3.5 - 5.3 mmol/L Final    Chloride 02/06/2025 104  98 - 107 mmol/L Final    Bicarbonate 02/06/2025 24  21 - 32 mmol/L Final    Anion Gap 02/06/2025 12  10 - 20 mmol/L Final    Urea Nitrogen 02/06/2025 14  6 - 23 mg/dL Final    Creatinine 02/06/2025 0.69  0.50 - 1.05 mg/dL Final    eGFR 02/06/2025 88  >60 mL/min/1.73m*2 Final    Calculations of estimated GFR are performed using the 2021 CKD-EPI Study Refit equation without the race variable for the IDMS-Traceable creatinine methods.  https://jasn.asnjournals.org/content/early/2021/09/22/ASN.3494661228    Calcium 02/06/2025 9.3  8.6 - 10.3 mg/dL Final    Albumin 02/06/2025 4.3  3.4 - 5.0 g/dL Final    Alkaline Phosphatase 02/06/2025 52  33 - 136 U/L Final    Total Protein 02/06/2025 6.8  6.4 - 8.2 g/dL Final    AST 02/06/2025 19  9 - 39 U/L Final    Bilirubin, Total 02/06/2025 0.4  0.0 - 1.2 mg/dL Final    ALT 02/06/2025 14  7 - 45 U/L Final    Patients treated with Sulfasalazine may generate falsely decreased results for ALT.    Amphetamine Screen, Urine 02/06/2025 Presumptive Negative  Presumptive Negative Final    CUTOFF LEVEL: 500 NG/ML   Cross-reactivity has been reported with high concentrations   of the following drugs: buproprion, chloroquine, chlorpromazine,   ephedrine, mephentermine, fenfluramine, phentermine,   phenylpropanolamine, pseudoephedrine, and propranolol.    Barbiturate Screen, Urine 02/06/2025 Presumptive Negative  Presumptive Negative Final    CUTOFF LEVEL: 200 NG/ML    Benzodiazepines Screen, Urine 02/06/2025 Presumptive Negative  Presumptive Negative Final    CUTOFF LEVEL: 200 NG/ML    Cannabinoid Screen, Urine 02/06/2025 Presumptive Negative  Presumptive Negative Final     CUTOFF LEVEL: 50 NG/ML    Cocaine Metabolite Screen, Urine 02/06/2025 Presumptive Negative  Presumptive Negative Final    CUTOFF LEVEL: 150 NG/ML    Fentanyl Screen, Urine 02/06/2025 Presumptive Negative  Presumptive Negative Final    CUTOFF LEVEL: 5 NG/ML    Opiate Screen, Urine 02/06/2025 Presumptive Negative  Presumptive Negative Final    CUTOFF LEVEL: 300 NG/ML  The opiate screen does not detect fentanyl, meperidine, or   tramadol. Oxycodone is not consistently detected (refer to  Oxycodone Screen, Urine result).    Oxycodone Screen, Urine 02/06/2025 Presumptive Negative  Presumptive Negative Final    CUTOFF LEVEL: 100 NG/ML  This test will accurately detect both oxycodone and oxymorphone.    PCP Screen, Urine 02/06/2025 Presumptive Negative  Presumptive Negative Final    CUTOFF LEVEL:  25 NG/ML  Cross-reactivity has been reported with dextromethorphan.    Methadone Screen, Urine 02/06/2025 Presumptive Negative  Presumptive Negative Final    CUTOFF LEVEL: 150 NG/ML  The metabolite L-alpha-acetylmethadol (LAAM) is not  detected by this method in concentrations that would  be found in the urine of patients on LAAM therapy.    Acetaminophen 02/06/2025 <10.0  10.0 - 30.0 ug/mL Final    Salicylate  02/06/2025 <3  4 - 20 mg/dL Final    Alcohol 02/06/2025 <10  <=10 mg/dL Final    Ventricular Rate 02/06/2025 83  BPM Final    Atrial Rate 02/06/2025 83  BPM Final    IN Interval 02/06/2025 166  ms Final    QRS Duration 02/06/2025 78  ms Final    QT Interval 02/06/2025 358  ms Final    QTC Calculation(Bazett) 02/06/2025 420  ms Final    P Axis 02/06/2025 75  degrees Final    R Axis 02/06/2025 79  degrees Final    T Axis 02/06/2025 75  degrees Final    QRS Count 02/06/2025 14  beats Final    Q Onset 02/06/2025 221  ms Final    P Onset 02/06/2025 138  ms Final    P Offset 02/06/2025 191  ms Final    T Offset 02/06/2025 400  ms Final    QTC Fredericia 02/06/2025 399  ms Final    Color, Urine 02/06/2025 Light-Yellow   Light-Yellow, Yellow, Dark-Yellow Final    Appearance, Urine 02/06/2025 Clear  Clear Final    Specific Gravity, Urine 02/06/2025 1.008  1.005 - 1.035 Final    pH, Urine 02/06/2025 7.5  5.0, 5.5, 6.0, 6.5, 7.0, 7.5, 8.0 Final    Protein, Urine 02/06/2025 NEGATIVE  NEGATIVE, 10 (TRACE), 20 (TRACE) mg/dL Final    Glucose, Urine 02/06/2025 Normal  Normal mg/dL Final    Blood, Urine 02/06/2025 NEGATIVE  NEGATIVE mg/dL Final    Ketones, Urine 02/06/2025 NEGATIVE  NEGATIVE mg/dL Final    Bilirubin, Urine 02/06/2025 NEGATIVE  NEGATIVE mg/dL Final    Urobilinogen, Urine 02/06/2025 Normal  Normal mg/dL Final    Nitrite, Urine 02/06/2025 NEGATIVE  NEGATIVE Final    Leukocyte Esterase, Urine 02/06/2025 NEGATIVE  NEGATIVE Final    Extra Tube 02/06/2025 Hold for add-ons.   Final    Auto resulted.    Extra Tube 02/06/2025 Hold for add-ons.   Final    Auto resulted.   Orders Only on 01/30/2025   Component Date Value Ref Range Status    CHOLESTEROL, TOTAL 01/30/2025 177  <200 mg/dL Final    HDL CHOLESTEROL 01/30/2025 77  > OR = 50 mg/dL Final    TRIGLYCERIDES 01/30/2025 74  <150 mg/dL Final    LDL-CHOLESTEROL 01/30/2025 84  mg/dL (calc) Final    Comment: Reference range: <100     Desirable range <100 mg/dL for primary prevention;    <70 mg/dL for patients with CHD or diabetic patients   with > or = 2 CHD risk factors.     LDL-C is now calculated using the Hafsa   calculation, which is a validated novel method providing   better accuracy than the Friedewald equation in the   estimation of LDL-C.   Shane GALINDO et al. ADELINA. 2013;310(19): 9373-7518   (http://education.[employer]/faq/GRT904)      CHOL/HDLC RATIO 01/30/2025 2.3  <5.0 (calc) Final    NON HDL CHOLESTEROL 01/30/2025 100  <130 mg/dL (calc) Final    Comment: For patients with diabetes plus 1 major ASCVD risk   factor, treating to a non-HDL-C goal of <100 mg/dL   (LDL-C of <70 mg/dL) is considered a therapeutic   option.      MAGNESIUM 01/30/2025 2.2  1.5 - 2.5  mg/dL Final    GLUCOSE 01/30/2025 90  65 - 99 mg/dL Final    Comment:               Fasting reference interval         UREA NITROGEN (BUN) 01/30/2025 15  7 - 25 mg/dL Final    CREATININE 01/30/2025 0.83  0.60 - 0.95 mg/dL Final    EGFR 01/30/2025 71  > OR = 60 mL/min/1.73m2 Final    SODIUM 01/30/2025 135  135 - 146 mmol/L Final    POTASSIUM 01/30/2025 4.1  3.5 - 5.3 mmol/L Final    CHLORIDE 01/30/2025 102  98 - 110 mmol/L Final    CARBON DIOXIDE 01/30/2025 23  20 - 32 mmol/L Final    ELECTROLYTE BALANCE 01/30/2025 10  7 - 17 mmol/L (calc) Final    CALCIUM 01/30/2025 9.3  8.6 - 10.4 mg/dL Final    PROTEIN, TOTAL 01/30/2025 6.7  6.1 - 8.1 g/dL Final    ALBUMIN 01/30/2025 4.4  3.6 - 5.1 g/dL Final    BILIRUBIN, TOTAL 01/30/2025 0.6  0.2 - 1.2 mg/dL Final    ALKALINE PHOSPHATASE 01/30/2025 58  37 - 153 U/L Final    AST 01/30/2025 20  10 - 35 U/L Final    ALT 01/30/2025 14  6 - 29 U/L Final    WHITE BLOOD CELL COUNT 01/30/2025 4.9  3.8 - 10.8 Thousand/uL Final    RED BLOOD CELL COUNT 01/30/2025 4.10  3.80 - 5.10 Million/uL Final    HEMOGLOBIN 01/30/2025 12.9  11.7 - 15.5 g/dL Final    HEMATOCRIT 01/30/2025 38.7  35.0 - 45.0 % Final    MCV 01/30/2025 94.4  80.0 - 100.0 fL Final    MCH 01/30/2025 31.5  27.0 - 33.0 pg Final    MCHC 01/30/2025 33.3  32.0 - 36.0 g/dL Final    Comment: For adults, a slight decrease in the calculated MCHC  value (in the range of 30 to 32 g/dL) is most likely  not clinically significant; however, it should be  interpreted with caution in correlation with other  red cell parameters and the patient's clinical  condition.      RDW 01/30/2025 12.7  11.0 - 15.0 % Final    PLATELET COUNT 01/30/2025 215  140 - 400 Thousand/uL Final    MPV 01/30/2025 11.6  7.5 - 12.5 fL Final    ABSOLUTE NEUTROPHILS 01/30/2025 2,808  1,500 - 7,800 cells/uL Final    ABSOLUTE LYMPHOCYTES 01/30/2025 1,205  850 - 3,900 cells/uL Final    ABSOLUTE MONOCYTES 01/30/2025 637  200 - 950 cells/uL Final    ABSOLUTE EOSINOPHILS  01/30/2025 211  15 - 500 cells/uL Final    ABSOLUTE BASOPHILS 01/30/2025 39  0 - 200 cells/uL Final    NEUTROPHILS 01/30/2025 57.3  % Final    LYMPHOCYTES 01/30/2025 24.6  % Final    MONOCYTES 01/30/2025 13.0  % Final    EOSINOPHILS 01/30/2025 4.3  % Final    BASOPHILS 01/30/2025 0.8  % Final    VITAMIN B12 01/30/2025 396  200 - 1,100 pg/mL Final    Comment:    Please Note: Although the reference range for vitamin  B12 is 200-1100 pg/mL, it has been reported that between  5 and 10% of patients with values between 200 and 400  pg/mL may experience neuropsychiatric and hematologic  abnormalities due to occult B12 deficiency; less than 1%  of patients with values above 400 pg/mL will have symptoms.         TSH W/REFLEX TO FT4 01/30/2025 0.67  0.40 - 4.50 mIU/L Final    VITAMIN D,25-OH,TOTAL,IA 01/30/2025 27 (L)  30 - 100 ng/mL Final    Comment: Vitamin D Status         25-OH Vitamin D:     Deficiency:                    <20 ng/mL  Insufficiency:             20 - 29 ng/mL  Optimal:                 > or = 30 ng/mL     For 25-OH Vitamin D testing on patients on   D2-supplementation and patients for whom quantitation   of D2 and D3 fractions is required, the QuestAssureD(TM)  25-OH VIT D, (D2,D3), LC/MS/MS is recommended: order   code 46685 (patients >2yrs).     See Note 1     Note 1     For additional information, please refer to   http://education.[place].[facility]/faq/WSI878   (This link is being provided for informational/  educational purposes only.)       CT head wo IV contrast    Result Date: 2/6/2025  Interpreted By:  Christopher Becerril, STUDY: CT HEAD WO IV CONTRAST;  2/6/2025 8:19 am   INDICATION: Signs/Symptoms:acute onset audio hallucinations.     COMPARISON: 02/24/2011 brain CT   ACCESSION NUMBER(S): [id number]   ORDERING CLINICIAN: SANDY PIERRE   TECHNIQUE: Noncontrast axial CT scan of head was performed. Angled reformats in brain and bone windows were generated. The images were reviewed in bone, brain,  blood and soft tissue windows.   FINDINGS: CSF Spaces: The sulci and ventricles are mildly prominent more so than noted previously. There is no extraaxial fluid collection.   Parenchyma:  The white matter has developed a few vague areas hypointensity most prominent in the right frontal region. No large acute infarct is noted.   No hemorrhage or mass is noted.   Calvarium: The calvarium is unremarkable.   Paranasal sinuses and mastoids: Visualized paranasal sinuses and mastoids are clear.       The white matter has developed patchy areas of hypodensity more so on the right frontal region since the 2011 CT which may be secondary to chronic microvascular ischemic changes. No large acute infarct is noted. No hemorrhage or mass is noted.     MACRO: None   Signed by: Christopher Becerril 2/6/2025 8:48 AM Dictation workstation:   [id number]      No MRI head results found for the past 12 months     Patient is an 79 year old female with no PPHx and a PMHx of hypothyroidism who presents for post hospitalization appointment. She was hospitalized in early February due to AVH and paranoia that had been occurring for about a week. Discharged after 3 weeks from [facility] on Zyprexa 12.5mg, and she has been doing well since that time. She denies any AVH since early February. She denies any past or present mood, anxiety sx. No substance abuse. Living with , who feels patient has been doing very well. Work up completed while admitted to [facility], and patient dx with vascular dementia. Will continue her current regimen, and monitor.     Risk Assessment:  Risk of harm to self: Low Risk -- Risk factors include: Age Protective factors include:Denies current suicidal ideation, Denies history of suicide attempts , Future-oriented talk , Willingness to seek help and support , and Interpersonal relationships and supports, e.g., family, friends, peers, community     Risk of harm to others: Low Risk - Risk factors include: TBI or organic brain disease.  Protective factors include: Lack of known history of harm to others , Lack of known history of violent ideation , and Lack of known access to firearms     Diagnoses and all orders for this visit:  Vascular dementia with psychotic disturbance, unspecified dementia severity (Primary)  Severe auditory hallucinations  -     Referral to Psychiatry       Plan/Recommendations:  Medications: Continue Zyprexa 12.5mg PO at bedtime for Psychiatric hospital  03/2025 Qtc of 410, labs significant for HLD, will continue to monitor  Orders: none  Follow up: 3 months, PRN if needed earlier  Call  Psychiatry at [phone] with issues.  For [facility] residents, [employer] is a 24/7 hotline you can call for assistance [[phone]]. Please call 485/618 or go to your closest Emergency Room if you feel unsafe. This includes thoughts of hurting yourself or anyone else, or having other troubles such as hearing voices, seeing visions, or having new and scary thoughts about the people around you.    Carol Sullivan MD

## 2025-06-19 ENCOUNTER — APPOINTMENT (OUTPATIENT)
Dept: PRIMARY CARE | Facility: CLINIC | Age: 80
End: 2025-06-19
Payer: MEDICARE

## 2025-06-19 VITALS
RESPIRATION RATE: 16 BRPM | OXYGEN SATURATION: 95 % | HEART RATE: 82 BPM | SYSTOLIC BLOOD PRESSURE: 110 MMHG | BODY MASS INDEX: 23.6 KG/M2 | TEMPERATURE: 97.2 F | WEIGHT: 133.2 LBS | HEIGHT: 63 IN | DIASTOLIC BLOOD PRESSURE: 60 MMHG

## 2025-06-19 DIAGNOSIS — F01.52 VASCULAR DEMENTIA WITH PSYCHOTIC DISTURBANCE, UNSPECIFIED DEMENTIA SEVERITY: ICD-10-CM

## 2025-06-19 DIAGNOSIS — E55.9 VITAMIN D DEFICIENCY: ICD-10-CM

## 2025-06-19 DIAGNOSIS — M80.00XA AGE-RELATED OSTEOPOROSIS WITH CURRENT PATHOLOGICAL FRACTURE, INITIAL ENCOUNTER: ICD-10-CM

## 2025-06-19 DIAGNOSIS — E78.5 DYSLIPIDEMIA: ICD-10-CM

## 2025-06-19 DIAGNOSIS — E03.9 ACQUIRED HYPOTHYROIDISM: ICD-10-CM

## 2025-06-19 DIAGNOSIS — Z00.00 MEDICARE ANNUAL WELLNESS VISIT, SUBSEQUENT: Primary | ICD-10-CM

## 2025-06-19 PROCEDURE — 1160F RVW MEDS BY RX/DR IN RCRD: CPT | Performed by: INTERNAL MEDICINE

## 2025-06-19 PROCEDURE — 1036F TOBACCO NON-USER: CPT | Performed by: INTERNAL MEDICINE

## 2025-06-19 PROCEDURE — G0439 PPPS, SUBSEQ VISIT: HCPCS | Performed by: INTERNAL MEDICINE

## 2025-06-19 PROCEDURE — 99214 OFFICE O/P EST MOD 30 MIN: CPT | Performed by: INTERNAL MEDICINE

## 2025-06-19 PROCEDURE — 1159F MED LIST DOCD IN RCRD: CPT | Performed by: INTERNAL MEDICINE

## 2025-06-19 RX ORDER — ACETAMINOPHEN 500 MG
125 TABLET ORAL DAILY
Qty: 90 TABLET | Refills: 3 | Status: SHIPPED | OUTPATIENT
Start: 2025-06-19

## 2025-06-19 ASSESSMENT — ENCOUNTER SYMPTOMS
SLEEP DISTURBANCE: 0
SHORTNESS OF BREATH: 0
NECK PAIN: 1
CONSTIPATION: 0
CHEST TIGHTNESS: 0
DYSURIA: 0
WEAKNESS: 0
SORE THROAT: 0
DIARRHEA: 0
ADENOPATHY: 0
PALPITATIONS: 0
FATIGUE: 0
CHILLS: 0
JOINT SWELLING: 0
NAUSEA: 0
ARTHRALGIAS: 1
UNEXPECTED WEIGHT CHANGE: 0
VOMITING: 0
COUGH: 0
DIZZINESS: 0
ABDOMINAL PAIN: 0
WHEEZING: 0
CONFUSION: 0

## 2025-06-19 NOTE — PATIENT INSTRUCTIONS
Was nice seeing you today.  Continue same medication.  Have lab work done before next appointment if labs were ordered today.  Fu in 6 month.  Call/ contact our office with any concerns.    If you have labs or test done and you can't see the report in your chart or you didn't hear from us in 2 weeks after test/labs done , please, call our office for reports.  Please , do not assume that they were normal.    Any test results  and questions you might have , will be discussed at next visit -- please make sure to make a follow up appt after testing if reports are abnormal or you have questions.  \

## 2025-06-19 NOTE — PROGRESS NOTES
Subjective   Yumiko Richter is a 79 y.o. female who presents for Annual Exam (CPE) and Follow-up (3 months follow up ).  CPE  3 months follow up   DEXA- 3.2025  Mammogram-1.15.2025  Colonoscopy-2.9.2022-Q10y    Never  a smoker   with  patient  in office today  Patient did  see psych   in 4.2025  No more hallucination but she is sleeping a lot, overall she is slowing down.  Will stop olanzapine 2.5 mg in am , continue the 10 mg at night      Review of Systems   Constitutional:  Negative for chills, fatigue and unexpected weight change.        Comment   HENT:  Negative for congestion, ear pain and sore throat.    Respiratory:  Negative for cough, chest tightness, shortness of breath and wheezing.    Cardiovascular:  Negative for palpitations and leg swelling.   Gastrointestinal:  Negative for abdominal pain, constipation, diarrhea, nausea and vomiting.   Genitourinary:  Negative for dysuria and urgency.   Musculoskeletal:  Positive for arthralgias and neck pain. Negative for joint swelling.        Sometimes    Skin:  Negative for rash.   Neurological:  Negative for dizziness and weakness.   Hematological:  Negative for adenopathy.   Psychiatric/Behavioral:  Negative for confusion and sleep disturbance.    All other systems reviewed and are negative.      Objective   Physical Exam  Constitutional:       Appearance: Normal appearance.   HENT:      Head: Normocephalic and atraumatic.   Eyes:      Pupils: Pupils are equal, round, and reactive to light.   Cardiovascular:      Rate and Rhythm: Normal rate and regular rhythm.   Pulmonary:      Effort: Pulmonary effort is normal.      Breath sounds: Normal breath sounds.   Musculoskeletal:         General: Normal range of motion.      Cervical back: Normal range of motion and neck supple.   Skin:     General: Skin is warm.   Neurological:      General: No focal deficit present.      Mental Status: She is alert and oriented to person, place, and time.   Psychiatric:        "  Mood and Affect: Mood normal.         Behavior: Behavior normal.       /60 (BP Location: Left arm, Patient Position: Sitting)   Pulse 82   Temp 36.2 °C (97.2 °F)   Resp 16   Ht 1.6 m (5' 3\")   Wt 60.4 kg (133 lb 3.2 oz)   SpO2 95%   BMI 23.60 kg/m²   Lab Results   Component Value Date    CHOL 209 (H) 03/05/2025    CHOL 177 01/30/2025    CHOL 157 02/29/2024     Lab Results   Component Value Date    HDL 63 03/05/2025    HDL 77 01/30/2025    HDL 57.2 02/29/2024     Lab Results   Component Value Date    LDLCALC 121 (H) 03/05/2025    LDLCALC 84 01/30/2025    LDLCALC 85 02/29/2024     Lab Results   Component Value Date    TRIG 136 03/05/2025    TRIG 74 01/30/2025    TRIG 74 02/29/2024     No components found for: \"CHOLHDL\"  Lab Results   Component Value Date    TSH 5.23 (H) 03/05/2025     Lab Results   Component Value Date    GLUCOSE 90 03/05/2025    CALCIUM 9.3 03/05/2025     03/05/2025    K 4.3 03/05/2025    CO2 27 03/05/2025     03/05/2025    BUN 27 (H) 03/05/2025    CREATININE 0.82 03/05/2025     Lab Results   Component Value Date    ALT 24 03/05/2025    AST 28 03/05/2025    ALKPHOS 60 03/05/2025    BILITOT 0.4 03/05/2025     Lab Results   Component Value Date    WBC 5.8 03/05/2025    HGB 12.4 03/05/2025    HCT 37.8 03/05/2025    MCV 96.7 03/05/2025     03/05/2025     Lab Results   Component Value Date    [id number] 328 03/05/2025       DEXA BONE DENSITY3/18/2025 8:30 am      INDICATION:  Signs/Symptoms:screening for osteoporosis. The patient is a 78 y/o  year old F.      ,Z13.820 Encounter for screening for osteoporosis,M81.0 Age-related  osteoporosis without current pathological fracture      COMPARISON:  12/03/2021 (baseline).      ACCESSION NUMBER(S):  [id number]      ORDERING CLINICIAN:  DANIELLE MABRY      TECHNIQUE:  DEXA BONE DENSITY      FINDINGS:  SPINE L1-L4  Bone Mineral Density: 0.846  T-Score -2.8  Z-Score -0.7  Bone Mineral Density change vs baseline:  -2.1%  Bone " Mineral Density change vs previous: -2.1%      LEFT FEMUR -TOTAL  Bone Mineral Density: 0.783  T-Score -1.8   Z-Score  0.4  Bone Mineral Density change vs baseline: -4.5%  Bone Mineral Density change vs previous:-4.5%      LEFT FEMUR -NECK  Bone Mineral Density: 0.776  T-Score -1.9  Z-Score 0.4  Bone Mineral Density change vs baseline: -7.1%  Bone Mineral Density change vs previous: -7.1%          World Health Organization (WHO) criteria for post-menopausal,   Women:  Normal:         T-score at or above -1 SD  Osteopenia:   T-score between -1 and -2.5 SD  Osteoporosis: T-score at or below -2.5 SD          10-year Fracture Risk:  Major Osteoporotic Fracture  14.5  Hip Fracture                        4.3      Note:  If no FRAX score is reported, it is because:  Some T-score for Spine Total or Hip Total or Femoral Neck at or below  -2.5      IMPRESSION:  DEXA:  According to World Health Organization criteria,  classification is osteoporosis.      Followup recommended in two years or sooner as clinically warranted.      All images and detailed analysis are available on the  Radiology  PACS.  Assessment/Plan   Problem List Items Addressed This Visit       Dyslipidemia    Not on statins  Ca score 0.62         Hypothyroidism    Osteoporosis    Refusing biphosphatase  Continue ca+vit d and vit d3         Vitamin D deficiency    Medicare annual wellness visit, subsequent - Primary    Vascular dementia with psychotic disturbance    Stable on olanzepine

## 2025-06-20 DIAGNOSIS — E03.8 OTHER SPECIFIED HYPOTHYROIDISM: Primary | ICD-10-CM

## 2025-06-20 LAB
T4 FREE SERPL-MCNC: 1.8 NG/DL (ref 0.8–1.8)
TSH SERPL-ACNC: 0.33 MIU/L (ref 0.4–4.5)

## 2025-07-03 ENCOUNTER — APPOINTMENT (OUTPATIENT)
Dept: BEHAVIORAL HEALTH | Facility: CLINIC | Age: 80
End: 2025-07-03
Payer: MEDICARE

## 2025-07-03 VITALS
BODY MASS INDEX: 23.58 KG/M2 | HEIGHT: 63 IN | WEIGHT: 133.1 LBS | RESPIRATION RATE: 18 BRPM | HEART RATE: 77 BPM | SYSTOLIC BLOOD PRESSURE: 136 MMHG | DIASTOLIC BLOOD PRESSURE: 82 MMHG

## 2025-07-03 DIAGNOSIS — F01.52 VASCULAR DEMENTIA WITH PSYCHOTIC DISTURBANCE, UNSPECIFIED DEMENTIA SEVERITY: ICD-10-CM

## 2025-07-03 PROCEDURE — 99214 OFFICE O/P EST MOD 30 MIN: CPT | Performed by: STUDENT IN AN ORGANIZED HEALTH CARE EDUCATION/TRAINING PROGRAM

## 2025-07-03 RX ORDER — OLANZAPINE 10 MG/1
10 TABLET, FILM COATED ORAL NIGHTLY
Qty: 90 TABLET | Refills: 1 | Status: SHIPPED | OUTPATIENT
Start: 2025-07-03 | End: 2025-07-03

## 2025-07-03 RX ORDER — OLANZAPINE 10 MG/1
10 TABLET, FILM COATED ORAL NIGHTLY
Qty: 30 TABLET | Refills: 2 | Status: SHIPPED | OUTPATIENT
Start: 2025-07-03 | End: 2025-10-01

## 2025-07-03 NOTE — PROGRESS NOTES
"Outpatient Psychiatry    Subjective   Yumiko Richter, a 79 y.o. female, presenting to Psychiatry for evaluation.  Patient is referred by Syed Trinh MD.      Interim Progress Notes:  Since initial appt on 04/03/2025, patient reports she has been been doing \"very good\". Had her zyprexa reduced from 12.5mg to 10mg ~ 1 month ago as she was sleeping more, this has since improved. Report stable appetite. Patient reports she feels \"her normal persona\" since her [facility] admission. She denies any SI/HI. Reports she will on occasion experience AH of \"music or funny jokes\" when she is trying to go to bed.  reports she has not noticed pt to appear internally stimulated since initiating zyprexa, and denies having any concerns of his own. Both patient and  deny any current concerns, no increase in AH since decreasing zyprexa.  feels she has \"slowed down\" over the past few years, but denies any acute concerns.     Psychiatric Review Of Systems:  Depressive Symptoms: negative  Manic Symptoms: negative  Anxiety Symptoms: Negative  Psychotic Symptoms: negative  Other Symptoms:    Current Medications:    Current Outpatient Medications:     calcium carbonate/vitamin D3 (CALCIUM 600 + D,3, ORAL), Take by mouth., Disp: , Rfl:     cholecalciferol (Vitamin D3) 125 mcg (5,000 units) tablet, Take 1 tablet (125 mcg) by mouth once daily., Disp: 90 tablet, Rfl: 3    levothyroxine (Synthroid, Levoxyl) 100 mcg tablet, Take 1 tablet (100 mcg) by mouth once daily., Disp: 90 tablet, Rfl: 1    OLANZapine (ZyPREXA) 10 mg tablet, Take 1 tablet (10 mg) by mouth once daily at bedtime., Disp: 30 tablet, Rfl: 2    Medical History:  Past Medical History:   Diagnosis Date    Personal history of other endocrine, nutritional and metabolic disease 10/09/2019    History of hypothyroidism    Personal history of other infectious and parasitic diseases 11/03/2017    History of herpes zoster       Past Psychiatric History:   Diagnoses: " denies  Previous Psychiatrist: denies  Therapy: denies denies  Current psychiatric medications: zyprexa 12.5mg PO QHS  Past psychiatric medications:denies  Past psychiatric treatments:  denies  Hospitalizations: denies  Suicide attempts: denies  Family psychiatric history: mother with dementia    Social History:   Currently lives: with  in [facility]  Education: high school  Work/Finances: retired  Marital history/children: , 1 son  Current stressors: daughter in law with metastatic breast cancer  Social support: , family  Legal History:  none   History: none  Access to Weapons:  none    Substance Use History:  Tobacco use: denies  Use of alcohol: denied  Use of caffeine: coffee less than 1 /day  Use of other substances: denies  Legal consequences of substance use: denies  Substance use disorder treatment: denies    OARRS:  Carol Sullivan MD on 7/23/2025  8:47 PM  I have personally reviewed the OARRS report for Yumiko Richter. I have considered the risks of abuse, dependence, addiction and diversion    Recent Results (from the past 8760 hours)   Drug Screen, Urine    Collection Time: 02/06/25  9:16 AM   Result Value Ref Range    Amphetamine Screen, Urine Presumptive Negative Presumptive Negative    Barbiturate Screen, Urine Presumptive Negative Presumptive Negative    Benzodiazepines Screen, Urine Presumptive Negative Presumptive Negative    Cannabinoid Screen, Urine Presumptive Negative Presumptive Negative    Cocaine Metabolite Screen, Urine Presumptive Negative Presumptive Negative    Fentanyl Screen, Urine Presumptive Negative Presumptive Negative    Opiate Screen, Urine Presumptive Negative Presumptive Negative    Oxycodone Screen, Urine Presumptive Negative Presumptive Negative    PCP Screen, Urine Presumptive Negative Presumptive Negative    Methadone Screen, Urine Presumptive Negative Presumptive Negative         Objective   Mental Status Exam  Appearance: well groomed, appears  "stated age  Attitude: Calm, cooperative, and engaged in conversation.  Behavior: Appropriate eye contact.   Motor Activity: No psychomotor agitation or retardation. No abnormal movements, tremors or tics. No evidence of extrapyramidal symptoms or tardive dyskinesia.  Speech: Regular rate, rhythm, volume. Spontaneous, no pressured speech.  Mood: \"good!\"  Affect: Euthymic, full range, mood congruent.  Thought Process: Linear, logical, and goal-directed. No loose associations or gross thought disorganization.  Thought Content: Denied current suicidal ideation or thoughts of harm to self, denied homicidal ideation or thoughts of harm to others. No delusional thinking elicited. No perseverations or obsessions identified.   Perception: Did not endorse auditory or visual hallucinations, did not appear to be responding to hallucinatory stimuli.   Cognition: Alert, oriented x3. Preserved attention span and concentration, recent and remote memory. Adequate fund of knowledge. No deficits in language.   Insight: Fair, in regards to understanding mental health condition  Judgement: Fair      Vitals:  Vitals:    07/03/25 1300   BP: 136/82   Pulse: 77   Resp: 18       Other Objective Information:  Office Visit on 06/19/2025   Component Date Value Ref Range Status    TSH W/REFLEX TO FT4 06/19/2025 0.33 (L)  0.40 - 4.50 mIU/L Final    T4, FREE 06/19/2025 1.8  0.8 - 1.8 ng/dL Final   Orders Only on 03/05/2025   Component Date Value Ref Range Status    CHOLESTEROL, TOTAL 03/05/2025 209 (H)  <200 mg/dL Final    HDL CHOLESTEROL 03/05/2025 63  > OR = 50 mg/dL Final    TRIGLYCERIDES 03/05/2025 136  <150 mg/dL Final    LDL-CHOLESTEROL 03/05/2025 121 (H)  mg/dL (calc) Final    Comment: Reference range: <100     Desirable range <100 mg/dL for primary prevention;    <70 mg/dL for patients with CHD or diabetic patients   with > or = 2 CHD risk factors.     LDL-C is now calculated using the Shane-Ring   calculation, which is a validated " novel method providing   better accuracy than the Friedewald equation in the   estimation of LDL-C.   Shane SS et al. ADELINA. 2013;310(19): 5954-3336   (http://Teralytics.AnTuTu/faq/GMB728)      CHOL/HDLC RATIO 03/05/2025 3.3  <5.0 (calc) Final    NON HDL CHOLESTEROL 03/05/2025 146 (H)  <130 mg/dL (calc) Final    Comment: For patients with diabetes plus 1 major ASCVD risk   factor, treating to a non-HDL-C goal of <100 mg/dL   (LDL-C of <70 mg/dL) is considered a therapeutic   option.      MAGNESIUM 03/05/2025 2.2  1.5 - 2.5 mg/dL Final    GLUCOSE 03/05/2025 90  65 - 99 mg/dL Final    Comment:               Fasting reference interval         UREA NITROGEN (BUN) 03/05/2025 27 (H)  7 - 25 mg/dL Final    CREATININE 03/05/2025 0.82  0.60 - 0.95 mg/dL Final    EGFR 03/05/2025 72  > OR = 60 mL/min/1.73m2 Final    SODIUM 03/05/2025 137  135 - 146 mmol/L Final    POTASSIUM 03/05/2025 4.3  3.5 - 5.3 mmol/L Final    CHLORIDE 03/05/2025 103  98 - 110 mmol/L Final    CARBON DIOXIDE 03/05/2025 27  20 - 32 mmol/L Final    ELECTROLYTE BALANCE 03/05/2025 7  7 - 17 mmol/L (calc) Final    CALCIUM 03/05/2025 9.3  8.6 - 10.4 mg/dL Final    PROTEIN, TOTAL 03/05/2025 6.4  6.1 - 8.1 g/dL Final    ALBUMIN 03/05/2025 4.0  3.6 - 5.1 g/dL Final    BILIRUBIN, TOTAL 03/05/2025 0.4  0.2 - 1.2 mg/dL Final    ALKALINE PHOSPHATASE 03/05/2025 60  37 - 153 U/L Final    AST 03/05/2025 28  10 - 35 U/L Final    ALT 03/05/2025 24  6 - 29 U/L Final    WHITE BLOOD CELL COUNT 03/05/2025 5.8  3.8 - 10.8 Thousand/uL Final    RED BLOOD CELL COUNT 03/05/2025 3.91  3.80 - 5.10 Million/uL Final    HEMOGLOBIN 03/05/2025 12.4  11.7 - 15.5 g/dL Final    HEMATOCRIT 03/05/2025 37.8  35.0 - 45.0 % Final    MCV 03/05/2025 96.7  80.0 - 100.0 fL Final    MCH 03/05/2025 31.7  27.0 - 33.0 pg Final    MCHC 03/05/2025 32.8  32.0 - 36.0 g/dL Final    Comment: For adults, a slight decrease in the calculated MCHC  value (in the range of 30 to 32 g/dL) is most  likely  not clinically significant; however, it should be  interpreted with caution in correlation with other  red cell parameters and the patient's clinical  condition.      RDW 03/05/2025 13.6  11.0 - 15.0 % Final    PLATELET COUNT 03/05/2025 244  140 - 400 Thousand/uL Final    MPV 03/05/2025 10.7  7.5 - 12.5 fL Final    ABSOLUTE NEUTROPHILS 03/05/2025 3,376  1,500 - 7,800 cells/uL Final    ABSOLUTE LYMPHOCYTES 03/05/2025 1,299  850 - 3,900 cells/uL Final    ABSOLUTE MONOCYTES 03/05/2025 748  200 - 950 cells/uL Final    ABSOLUTE EOSINOPHILS 03/05/2025 348  15 - 500 cells/uL Final    ABSOLUTE BASOPHILS 03/05/2025 29  0 - 200 cells/uL Final    NEUTROPHILS 03/05/2025 58.2  % Final    LYMPHOCYTES 03/05/2025 22.4  % Final    MONOCYTES 03/05/2025 12.9  % Final    EOSINOPHILS 03/05/2025 6.0  % Final    BASOPHILS 03/05/2025 0.5  % Final    VITAMIN B12 03/05/2025 328  200 - 1,100 pg/mL Final    Comment:    Please Note: Although the reference range for vitamin  B12 is 200-1100 pg/mL, it has been reported that between  5 and 10% of patients with values between 200 and 400  pg/mL may experience neuropsychiatric and hematologic  abnormalities due to occult B12 deficiency; less than 1%  of patients with values above 400 pg/mL will have symptoms.         TSH W/REFLEX TO FT4 03/05/2025 5.23 (H)  0.40 - 4.50 mIU/L Final    T4, FREE 03/05/2025 1.3  0.8 - 1.8 ng/dL Final    VITAMIN D,25-OH,TOTAL,IA 03/05/2025 38  30 - 100 ng/mL Final    Comment: Vitamin D Status         25-OH Vitamin D:     Deficiency:                    <20 ng/mL  Insufficiency:             20 - 29 ng/mL  Optimal:                 > or = 30 ng/mL     For 25-OH Vitamin D testing on patients on   D2-supplementation and patients for whom quantitation   of D2 and D3 fractions is required, the QuestAssureD(TM)  25-OH VIT D, (D2,D3), LC/MS/MS is recommended: order   code 79355 (patients >2yrs).     See Note 1     Note 1     For additional information, please refer to    http://MessageOne.[employer]/faq/IWJ456   (This link is being provided for informational/  educational purposes only.)     Admission on 02/06/2025, Discharged on 02/06/2025   Component Date Value Ref Range Status    WBC 02/06/2025 5.7  4.4 - 11.3 x10*3/uL Final    nRBC 02/06/2025 0.0  0.0 - 0.0 /100 WBCs Final    RBC 02/06/2025 4.16  4.00 - 5.20 x10*6/uL Final    Hemoglobin 02/06/2025 12.7  12.0 - 16.0 g/dL Final    Hematocrit 02/06/2025 38.3  36.0 - 46.0 % Final    MCV 02/06/2025 92  80 - 100 fL Final    MCH 02/06/2025 30.5  26.0 - 34.0 pg Final    MCHC 02/06/2025 33.2  32.0 - 36.0 g/dL Final    RDW 02/06/2025 12.5  11.5 - 14.5 % Final    Platelets 02/06/2025 181  150 - 450 x10*3/uL Final    Neutrophils % 02/06/2025 73.7  40.0 - 80.0 % Final    Immature Granulocytes %, Automated 02/06/2025 0.4  0.0 - 0.9 % Final    Immature Granulocyte Count (IG) includes promyelocytes, myelocytes and metamyelocytes but does not include bands. Percent differential counts (%) should be interpreted in the context of the absolute cell counts (cells/UL).    Lymphocytes % 02/06/2025 13.2  13.0 - 44.0 % Final    Monocytes % 02/06/2025 11.3  2.0 - 10.0 % Final    Eosinophils % 02/06/2025 0.9  0.0 - 6.0 % Final    Basophils % 02/06/2025 0.5  0.0 - 2.0 % Final    Neutrophils Absolute 02/06/2025 4.18  1.60 - 5.50 x10*3/uL Final    Percent differential counts (%) should be interpreted in the context of the absolute cell counts (cells/uL).    Immature Granulocytes Absolute, Au* 02/06/2025 0.02  0.00 - 0.50 x10*3/uL Final    Lymphocytes Absolute 02/06/2025 0.75 (L)  0.80 - 3.00 x10*3/uL Final    Monocytes Absolute 02/06/2025 0.64  0.05 - 0.80 x10*3/uL Final    Eosinophils Absolute 02/06/2025 0.05  0.00 - 0.40 x10*3/uL Final    Basophils Absolute 02/06/2025 0.03  0.00 - 0.10 x10*3/uL Final    Glucose 02/06/2025 117 (H)  74 - 99 mg/dL Final    Sodium 02/06/2025 136  136 - 145 mmol/L Final    Potassium 02/06/2025 3.6  3.5 - 5.3 mmol/L  Final    Chloride 02/06/2025 104  98 - 107 mmol/L Final    Bicarbonate 02/06/2025 24  21 - 32 mmol/L Final    Anion Gap 02/06/2025 12  10 - 20 mmol/L Final    Urea Nitrogen 02/06/2025 14  6 - 23 mg/dL Final    Creatinine 02/06/2025 0.69  0.50 - 1.05 mg/dL Final    eGFR 02/06/2025 88  >60 mL/min/1.73m*2 Final    Calculations of estimated GFR are performed using the 2021 CKD-EPI Study Refit equation without the race variable for the IDMS-Traceable creatinine methods.  https://jasn.asnjournals.org/content/early/2021/09/22/ASN.6088791831    Calcium 02/06/2025 9.3  8.6 - 10.3 mg/dL Final    Albumin 02/06/2025 4.3  3.4 - 5.0 g/dL Final    Alkaline Phosphatase 02/06/2025 52  33 - 136 U/L Final    Total Protein 02/06/2025 6.8  6.4 - 8.2 g/dL Final    AST 02/06/2025 19  9 - 39 U/L Final    Bilirubin, Total 02/06/2025 0.4  0.0 - 1.2 mg/dL Final    ALT 02/06/2025 14  7 - 45 U/L Final    Patients treated with Sulfasalazine may generate falsely decreased results for ALT.    Amphetamine Screen, Urine 02/06/2025 Presumptive Negative  Presumptive Negative Final    CUTOFF LEVEL: 500 NG/ML   Cross-reactivity has been reported with high concentrations   of the following drugs: buproprion, chloroquine, chlorpromazine,   ephedrine, mephentermine, fenfluramine, phentermine,   phenylpropanolamine, pseudoephedrine, and propranolol.    Barbiturate Screen, Urine 02/06/2025 Presumptive Negative  Presumptive Negative Final    CUTOFF LEVEL: 200 NG/ML    Benzodiazepines Screen, Urine 02/06/2025 Presumptive Negative  Presumptive Negative Final    CUTOFF LEVEL: 200 NG/ML    Cannabinoid Screen, Urine 02/06/2025 Presumptive Negative  Presumptive Negative Final    CUTOFF LEVEL: 50 NG/ML    Cocaine Metabolite Screen, Urine 02/06/2025 Presumptive Negative  Presumptive Negative Final    CUTOFF LEVEL: 150 NG/ML    Fentanyl Screen, Urine 02/06/2025 Presumptive Negative  Presumptive Negative Final    CUTOFF LEVEL: 5 NG/ML    Opiate Screen, Urine 02/06/2025  Presumptive Negative  Presumptive Negative Final    CUTOFF LEVEL: 300 NG/ML  The opiate screen does not detect fentanyl, meperidine, or   tramadol. Oxycodone is not consistently detected (refer to  Oxycodone Screen, Urine result).    Oxycodone Screen, Urine 02/06/2025 Presumptive Negative  Presumptive Negative Final    CUTOFF LEVEL: 100 NG/ML  This test will accurately detect both oxycodone and oxymorphone.    PCP Screen, Urine 02/06/2025 Presumptive Negative  Presumptive Negative Final    CUTOFF LEVEL:  25 NG/ML  Cross-reactivity has been reported with dextromethorphan.    Methadone Screen, Urine 02/06/2025 Presumptive Negative  Presumptive Negative Final    CUTOFF LEVEL: 150 NG/ML  The metabolite L-alpha-acetylmethadol (LAAM) is not  detected by this method in concentrations that would  be found in the urine of patients on LAAM therapy.    Acetaminophen 02/06/2025 <10.0  10.0 - 30.0 ug/mL Final    Salicylate  02/06/2025 <3  4 - 20 mg/dL Final    Alcohol 02/06/2025 <10  <=10 mg/dL Final    Ventricular Rate 02/06/2025 83  BPM Final    Atrial Rate 02/06/2025 83  BPM Final    NE Interval 02/06/2025 166  ms Final    QRS Duration 02/06/2025 78  ms Final    QT Interval 02/06/2025 358  ms Final    QTC Calculation(Bazett) 02/06/2025 420  ms Final    P Axis 02/06/2025 75  degrees Final    R Axis 02/06/2025 79  degrees Final    T Axis 02/06/2025 75  degrees Final    QRS Count 02/06/2025 14  beats Final    Q Onset 02/06/2025 221  ms Final    P Onset 02/06/2025 138  ms Final    P Offset 02/06/2025 191  ms Final    T Offset 02/06/2025 400  ms Final    QTC Fredericia 02/06/2025 399  ms Final    Color, Urine 02/06/2025 Light-Yellow  Light-Yellow, Yellow, Dark-Yellow Final    Appearance, Urine 02/06/2025 Clear  Clear Final    Specific Gravity, Urine 02/06/2025 1.008  1.005 - 1.035 Final    pH, Urine 02/06/2025 7.5  5.0, 5.5, 6.0, 6.5, 7.0, 7.5, 8.0 Final    Protein, Urine 02/06/2025 NEGATIVE  NEGATIVE, 10 (TRACE), 20 (TRACE) mg/dL  Final    Glucose, Urine 02/06/2025 Normal  Normal mg/dL Final    Blood, Urine 02/06/2025 NEGATIVE  NEGATIVE mg/dL Final    Ketones, Urine 02/06/2025 NEGATIVE  NEGATIVE mg/dL Final    Bilirubin, Urine 02/06/2025 NEGATIVE  NEGATIVE mg/dL Final    Urobilinogen, Urine 02/06/2025 Normal  Normal mg/dL Final    Nitrite, Urine 02/06/2025 NEGATIVE  NEGATIVE Final    Leukocyte Esterase, Urine 02/06/2025 NEGATIVE  NEGATIVE Final    Extra Tube 02/06/2025 Hold for add-ons.   Final    Auto resulted.    Extra Tube 02/06/2025 Hold for add-ons.   Final    Auto resulted.   Orders Only on 01/30/2025   Component Date Value Ref Range Status    CHOLESTEROL, TOTAL 01/30/2025 177  <200 mg/dL Final    HDL CHOLESTEROL 01/30/2025 77  > OR = 50 mg/dL Final    TRIGLYCERIDES 01/30/2025 74  <150 mg/dL Final    LDL-CHOLESTEROL 01/30/2025 84  mg/dL (calc) Final    Comment: Reference range: <100     Desirable range <100 mg/dL for primary prevention;    <70 mg/dL for patients with CHD or diabetic patients   with > or = 2 CHD risk factors.     LDL-C is now calculated using the Hafsa   calculation, which is a validated novel method providing   better accuracy than the Friedewald equation in the   estimation of LDL-C.   Shane SS et al. ADELINA. 2013;310(19): 9601-0368   (http://education.[employer]/faq/BWC752)      CHOL/HDLC RATIO 01/30/2025 2.3  <5.0 (calc) Final    NON HDL CHOLESTEROL 01/30/2025 100  <130 mg/dL (calc) Final    Comment: For patients with diabetes plus 1 major ASCVD risk   factor, treating to a non-HDL-C goal of <100 mg/dL   (LDL-C of <70 mg/dL) is considered a therapeutic   option.      MAGNESIUM 01/30/2025 2.2  1.5 - 2.5 mg/dL Final    GLUCOSE 01/30/2025 90  65 - 99 mg/dL Final    Comment:               Fasting reference interval         UREA NITROGEN (BUN) 01/30/2025 15  7 - 25 mg/dL Final    CREATININE 01/30/2025 0.83  0.60 - 0.95 mg/dL Final    EGFR 01/30/2025 71  > OR = 60 mL/min/1.73m2 Final    SODIUM 01/30/2025  135  135 - 146 mmol/L Final    POTASSIUM 01/30/2025 4.1  3.5 - 5.3 mmol/L Final    CHLORIDE 01/30/2025 102  98 - 110 mmol/L Final    CARBON DIOXIDE 01/30/2025 23  20 - 32 mmol/L Final    ELECTROLYTE BALANCE 01/30/2025 10  7 - 17 mmol/L (calc) Final    CALCIUM 01/30/2025 9.3  8.6 - 10.4 mg/dL Final    PROTEIN, TOTAL 01/30/2025 6.7  6.1 - 8.1 g/dL Final    ALBUMIN 01/30/2025 4.4  3.6 - 5.1 g/dL Final    BILIRUBIN, TOTAL 01/30/2025 0.6  0.2 - 1.2 mg/dL Final    ALKALINE PHOSPHATASE 01/30/2025 58  37 - 153 U/L Final    AST 01/30/2025 20  10 - 35 U/L Final    ALT 01/30/2025 14  6 - 29 U/L Final    WHITE BLOOD CELL COUNT 01/30/2025 4.9  3.8 - 10.8 Thousand/uL Final    RED BLOOD CELL COUNT 01/30/2025 4.10  3.80 - 5.10 Million/uL Final    HEMOGLOBIN 01/30/2025 12.9  11.7 - 15.5 g/dL Final    HEMATOCRIT 01/30/2025 38.7  35.0 - 45.0 % Final    MCV 01/30/2025 94.4  80.0 - 100.0 fL Final    MCH 01/30/2025 31.5  27.0 - 33.0 pg Final    MCHC 01/30/2025 33.3  32.0 - 36.0 g/dL Final    Comment: For adults, a slight decrease in the calculated MCHC  value (in the range of 30 to 32 g/dL) is most likely  not clinically significant; however, it should be  interpreted with caution in correlation with other  red cell parameters and the patient's clinical  condition.      RDW 01/30/2025 12.7  11.0 - 15.0 % Final    PLATELET COUNT 01/30/2025 215  140 - 400 Thousand/uL Final    MPV 01/30/2025 11.6  7.5 - 12.5 fL Final    ABSOLUTE NEUTROPHILS 01/30/2025 2,808  1,500 - 7,800 cells/uL Final    ABSOLUTE LYMPHOCYTES 01/30/2025 1,205  850 - 3,900 cells/uL Final    ABSOLUTE MONOCYTES 01/30/2025 637  200 - 950 cells/uL Final    ABSOLUTE EOSINOPHILS 01/30/2025 211  15 - 500 cells/uL Final    ABSOLUTE BASOPHILS 01/30/2025 39  0 - 200 cells/uL Final    NEUTROPHILS 01/30/2025 57.3  % Final    LYMPHOCYTES 01/30/2025 24.6  % Final    MONOCYTES 01/30/2025 13.0  % Final    EOSINOPHILS 01/30/2025 4.3  % Final    BASOPHILS 01/30/2025 0.8  % Final    VITAMIN  B12 01/30/2025 396  200 - 1,100 pg/mL Final    Comment:    Please Note: Although the reference range for vitamin  B12 is 200-1100 pg/mL, it has been reported that between  5 and 10% of patients with values between 200 and 400  pg/mL may experience neuropsychiatric and hematologic  abnormalities due to occult B12 deficiency; less than 1%  of patients with values above 400 pg/mL will have symptoms.         TSH W/REFLEX TO FT4 01/30/2025 0.67  0.40 - 4.50 mIU/L Final    VITAMIN D,25-OH,TOTAL,IA 01/30/2025 27 (L)  30 - 100 ng/mL Final    Comment: Vitamin D Status         25-OH Vitamin D:     Deficiency:                    <20 ng/mL  Insufficiency:             20 - 29 ng/mL  Optimal:                 > or = 30 ng/mL     For 25-OH Vitamin D testing on patients on   D2-supplementation and patients for whom quantitation   of D2 and D3 fractions is required, the QuestAssureD(TM)  25-OH VIT D, (D2,D3), LC/MS/MS is recommended: order   code 86673 (patients >2yrs).     See Note 1     Note 1     For additional information, please refer to   http://education.[employer]/faq/XKY477   (This link is being provided for informational/  educational purposes only.)       CT head wo IV contrast  Result Date: 2/6/2025  Interpreted By:  Christopher Becerril, STUDY: CT HEAD WO IV CONTRAST;  2/6/2025 8:19 am   INDICATION: Signs/Symptoms:acute onset audio hallucinations.     COMPARISON: 02/24/2011 brain CT   ACCESSION NUMBER(S): [id number]   ORDERING CLINICIAN: SANDY PIERRE   TECHNIQUE: Noncontrast axial CT scan of head was performed. Angled reformats in brain and bone windows were generated. The images were reviewed in bone, brain, blood and soft tissue windows.   FINDINGS: CSF Spaces: The sulci and ventricles are mildly prominent more so than noted previously. There is no extraaxial fluid collection.   Parenchyma:  The white matter has developed a few vague areas hypointensity most prominent in the right frontal region. No large acute  infarct is noted.   No hemorrhage or mass is noted.   Calvarium: The calvarium is unremarkable.   Paranasal sinuses and mastoids: Visualized paranasal sinuses and mastoids are clear.       The white matter has developed patchy areas of hypodensity more so on the right frontal region since the 2011 CT which may be secondary to chronic microvascular ischemic changes. No large acute infarct is noted. No hemorrhage or mass is noted.     MACRO: None   Signed by: Christopher Becerril 2/6/2025 8:48 AM Dictation workstation:   [id number]       No MRI head results found for the past 12 months     Patient is an 79 year old female with no PPHx and a PMHx of hypothyroidism who presents for post hospitalization appointment. She was hospitalized in early February due to AVH and paranoia that had been occurring for about a week. Discharged after 3 weeks from [facility] on Zyprexa 12.5mg, and she has been doing well since that time. She denies any AVH since early February. She denies any past or present mood, anxiety sx. No substance abuse. Living with , who feels patient has been doing very well. Work up completed while admitted to [facility], and patient dx with vascular dementia. Will continue her current regimen, and monitor.     Risk Assessment:  Risk of harm to self: Low Risk -- Risk factors include: Age Protective factors include:Denies current suicidal ideation, Denies history of suicide attempts , Future-oriented talk , Willingness to seek help and support , and Interpersonal relationships and supports, e.g., family, friends, peers, community     Risk of harm to others: Low Risk - Risk factors include: TBI or organic brain disease. Protective factors include: Lack of known history of harm to others , Lack of known history of violent ideation , and Lack of known access to firearms     Diagnoses and all orders for this visit:  Vascular dementia with psychotic disturbance, unspecified dementia severity  -     Discontinue: OLANZapine  (ZyPREXA) 10 mg tablet; Take 1 tablet (10 mg) by mouth once daily at bedtime.  -     OLANZapine (ZyPREXA) 10 mg tablet; Take 1 tablet (10 mg) by mouth once daily at bedtime.         Plan/Recommendations:  Medications: Continue Zyprexa 10mg PO at bedtime for [facility]  03/2025 Qtc of 410, labs significant for HLD, will continue to monitor  Orders: none  Follow up: 3 months, PRN if needed earlier  Call  Psychiatry at [phone] with issues.  For [facility] residents, [place] is a 24/7 hotline you can call for assistance [[phone]]. Please call [phone] or go to your closest Emergency Room if you feel unsafe. This includes thoughts of hurting yourself or anyone else, or having other troubles such as hearing voices, seeing visions, or having new and scary thoughts about the people around you.    Carol Sullivan MD

## 2025-07-03 NOTE — PROGRESS NOTES
[facility] Nurse Note    Name: Yumiko Richter  MRN: [id number]  YOB: 1945    Date: 07/03/25    Reason for Visit:  No chief complaint on file.    Vitals:       Problem List[1]    Allergies[2]    Encounter Medications[3]    Progress:             [1]   Patient Active Problem List  Diagnosis    Acquired trigger finger    Basal cell carcinoma of cheek    Bilateral tinnitus    Cervical arthritis    Dyslipidemia    Headache    Hip pain, right    Hypothyroidism    Leucopenia    Low back pain    Macular degeneration    Mohs defect of cheek    Lung nodules    Osteoarthritis    Osteopenia    Osteoporosis    Presbycusis    Shingles    Spinal stenosis    Vitamin B12 deficiency    Vitamin D deficiency    Weakness generalized    Carotid artery calcification    Cervical muscle pain    Asymptomatic varicose veins    Primary osteoarthritis of both knees    Primary osteoarthritis of left knee    Sleep apnea    Screening for cardiovascular condition    Leg pain, anterior, right    Medicare annual wellness visit, subsequent    Chronic pain of both knees    Viral syndrome    Severe auditory hallucinations    Other insomnia    Neurocognitive disorder    Psychosis (Multi)    Vascular dementia with psychotic disturbance   [2] No Known Allergies  [3]   Outpatient Encounter Medications as of 7/3/2025   Medication Sig Dispense Refill    calcium carbonate/vitamin D3 (CALCIUM 600 + D,3, ORAL) Take by mouth.      cholecalciferol (Vitamin D3) 125 mcg (5,000 units) tablet Take 1 tablet (125 mcg) by mouth once daily. 90 tablet 3    levothyroxine (Synthroid, Levoxyl) 100 mcg tablet Take 1 tablet (100 mcg) by mouth once daily. 90 tablet 1    OLANZapine (ZyPREXA) 10 mg tablet Take 1 tablet (10 mg) by mouth once daily at bedtime. 90 tablet 1     No facility-administered encounter medications on file as of 7/3/2025.

## 2025-07-20 DIAGNOSIS — E03.8 OTHER SPECIFIED HYPOTHYROIDISM: ICD-10-CM

## 2025-07-24 NOTE — PROGRESS NOTES
I saw and evaluated the patient. I personally obtained the key and critical portions of the history and physical exam or was physically present for key and critical portions performed by the resident/fellow. I reviewed the resident/fellow's documentation and discussed the patient with the resident/fellow. I agree with the resident/fellow's medical decision making as documented in the note.    I spent 30 minutes in the professional and overall care of this patient.    Mane Samaniego MD PhD

## 2025-07-28 ENCOUNTER — APPOINTMENT (OUTPATIENT)
Dept: NEUROLOGY | Facility: CLINIC | Age: 80
End: 2025-07-28
Payer: MEDICARE

## 2025-07-28 VITALS
SYSTOLIC BLOOD PRESSURE: 104 MMHG | HEIGHT: 63 IN | BODY MASS INDEX: 23.99 KG/M2 | DIASTOLIC BLOOD PRESSURE: 62 MMHG | WEIGHT: 135.4 LBS | TEMPERATURE: 97.1 F | HEART RATE: 81 BPM

## 2025-07-28 DIAGNOSIS — R93.0 ABNORMAL CT OF THE HEAD: Primary | ICD-10-CM

## 2025-07-28 DIAGNOSIS — R44.0 SEVERE AUDITORY HALLUCINATIONS: ICD-10-CM

## 2025-07-28 PROCEDURE — 1036F TOBACCO NON-USER: CPT | Performed by: PSYCHIATRY & NEUROLOGY

## 2025-07-28 PROCEDURE — 1159F MED LIST DOCD IN RCRD: CPT | Performed by: PSYCHIATRY & NEUROLOGY

## 2025-07-28 PROCEDURE — 99204 OFFICE O/P NEW MOD 45 MIN: CPT | Performed by: PSYCHIATRY & NEUROLOGY

## 2025-07-28 PROCEDURE — G2211 COMPLEX E/M VISIT ADD ON: HCPCS | Performed by: PSYCHIATRY & NEUROLOGY

## 2025-07-28 ASSESSMENT — PATIENT HEALTH QUESTIONNAIRE - PHQ9
SUM OF ALL RESPONSES TO PHQ9 QUESTIONS 1 & 2: 0
2. FEELING DOWN, DEPRESSED OR HOPELESS: NOT AT ALL
1. LITTLE INTEREST OR PLEASURE IN DOING THINGS: NOT AT ALL

## 2025-07-28 ASSESSMENT — LIFESTYLE VARIABLES
HOW MANY STANDARD DRINKS CONTAINING ALCOHOL DO YOU HAVE ON A TYPICAL DAY: PATIENT DOES NOT DRINK
AUDIT-C TOTAL SCORE: 0
SKIP TO QUESTIONS 9-10: 1
HOW OFTEN DO YOU HAVE SIX OR MORE DRINKS ON ONE OCCASION: NEVER
HOW OFTEN DO YOU HAVE A DRINK CONTAINING ALCOHOL: NEVER

## 2025-07-28 NOTE — PROGRESS NOTES
"Consulting Physician: Dr. Trinh    Chief Complaint: Dementia    History Of Present Illness  Yumiko Richter is a 80 y.o. female presenting with Dementia.    On 2/2025, she developed hallucinations.  She was hearing voices.  Apparently, people were telling her that she was going to be attacked.  It caused her to be scared.  She went to [facility] as a result.  She was transferred to [facility] psychiatry unit.  She was there for 3 weeks.  From there, she went to a nursing home.  She was only at the nursing home for one week.  Since being home, she has been doing well.  She cooks, cleans, and appears to be functionally independent.  She drives without difficulty.  She denies any difficulty with remembering recent conversations or recent events.  She denies any difficulty with misplacing things.  She manages her medications without difficulty.      The patient denies any double vision, loss of peripheral vision, slurred speech, difficulty getting the words out, facial droop, facial numbness, focal weakness, focal numbness, loss of coordination, or loss of balance.      She has been having auditory hallucinations - characterized by hearing music.  It's mainly Gnosticist songs.  Since being out of the hospital, she continues to hear the music all the time.       Past Medical History  Medical History[1]    Surgical History  Surgical History[2]    Family History  Family History[3]     Social History   reports that she has never smoked. She has never used smokeless tobacco. She reports that she does not drink alcohol and does not use drugs.     Allergies  Patient has no known allergies.    Medications  Current Medications[4]      Last Recorded Vitals   Blood pressure 104/62, pulse 81, temperature 36.2 °C (97.1 °F), temperature source Temporal, height 1.6 m (5' 3\"), weight 61.4 kg (135 lb 6.4 oz).    Objective:  Gen: NAD  Neuro:  --HIF:   Mini-Mental Status Exam:  Orientation to Time (Year, Season, Month, Date, Day of Week): 5  Orientation " "to Place (State, County, City, Name of Place, Floor):  5  Registration (Apple, Table, Darcie): 3  Attention (Spell 'World' backwards): 4  Delayed Verbal Recall: 2  Naming (Watch, Pen): 2  Repetition (No Ifs, Ands, or Buts): 1  Follows command with crossover: 3  Read a sentence: 0  Write a sentence: 1  Copy a figure: 0  Total Score: 26    --CN:  PERRLA, EOMI, VFF, no visible facial asymmetry, facial sensation intact, no tongue or palatal deviation, SCM intact  --Motor: Moves all 4 extremities equally; no focal deficits  --Sensory: Intact to light touch, intact to pinprick  --Reflex: 2+ symmetric, toes down  --Cerebellum: FTN and HTS intact  --Gait: Normal, narrow based.  Toe and Heal Walking Intact.  Tandem Intact    Relevant Results  Lab Results   Component Value Date    WBC 5.8 03/05/2025    HGB 12.4 03/05/2025    HCT 37.8 03/05/2025    MCV 96.7 03/05/2025     03/05/2025       Lab Results   Component Value Date    GLUCOSE 90 03/05/2025    CALCIUM 9.3 03/05/2025     03/05/2025    K 4.3 03/05/2025    CO2 27 03/05/2025     03/05/2025    BUN 27 (H) 03/05/2025    CREATININE 0.82 03/05/2025       No results found for: \"HGBA1C\"    Lab Results   Component Value Date    CHOL 209 (H) 03/05/2025    CHOL 177 01/30/2025    CHOL 157 02/29/2024     Lab Results   Component Value Date    HDL 63 03/05/2025    HDL 77 01/30/2025    HDL 57.2 02/29/2024     Lab Results   Component Value Date    LDLCALC 121 (H) 03/05/2025    LDLCALC 84 01/30/2025    LDLCALC 85 02/29/2024     Lab Results   Component Value Date    TRIG 136 03/05/2025    TRIG 74 01/30/2025    TRIG 74 02/29/2024     No components found for: \"CHOLHDL\"    ED Visit (2/6/2025):    Plan of care discussed, patient is stable appearing, in no distress, calm, cooperative, able to give some history,  is at the bedside and able to provide additional history.  Lab work obtained and reviewed and patient is medically clear, CT scan does show some white matter has " developed patchy areas of hypodensity more so on the right frontal region since the 2011 CT scan.  Was seen by emergency psychiatric assessment team who recommend inpatient, pending placement, family and patient are agreeable for placement.  Signing out to ED resident for location of final placement, pending EPAT to place patient       CT Head (I personally reviewed the images/tracings with the following interpretation)  No acute changes  Hypodensity noted in the right frontal lobe    Vitamin B12: 328       Assessment:  This is an 80 year old female presenting for evaluation of auditory hallucinations.  Apparently, she has had auditory hallucinations (where she was hearing music several years ago).  In February 2025, she developed auditory hallucinations where she was hearing voices (of people telling her that someone was going to hurt her).  She has never had any significant memory difficulty.  In February 2025, she went to the ER due to the auditory hallucinations.  She was transferred to [facility].  She was diagnosed with Vascular Dementia.  She was treated and has been doing well.    She remains independent.  She no longer hears voices.  She does continue to hear music.  On exam, her MMSE was 26/30.  I reviewed her CT Head which does show a hypodensity in the right frontal region.     Auditory Hallucinations -  doubt vascular dementia; possibly related to an underlying psychiatric disorder (given the longstanding nature of hallucinations and lack of functional decline)  Abnormal CT Head - hypodensity noted on right frontal lobe    Plan:  MRI Brain      Sean Santos MD  [facility]  Department of Neurology      A copy of this note was sent to the referring provider.         [1]   Past Medical History:  Diagnosis Date    Personal history of other endocrine, nutritional and metabolic disease 10/09/2019    History of hypothyroidism    Personal history of other infectious and parasitic  diseases 11/03/2017    History of herpes zoster   [2]   Past Surgical History:  Procedure Laterality Date    OTHER SURGICAL HISTORY  03/24/2021    Cataract surgery   [3] No family history on file.  [4]   Current Outpatient Medications:     calcium carbonate/vitamin D3 (CALCIUM 600 + D,3, ORAL), Take by mouth., Disp: , Rfl:     cholecalciferol (Vitamin D3) 125 mcg (5,000 units) tablet, Take 1 tablet (125 mcg) by mouth once daily., Disp: 90 tablet, Rfl: 3    levothyroxine (Synthroid, Levoxyl) 100 mcg tablet, Take 1 tablet (100 mcg) by mouth once daily., Disp: 90 tablet, Rfl: 1    OLANZapine (ZyPREXA) 10 mg tablet, Take 1 tablet (10 mg) by mouth once daily at bedtime., Disp: 30 tablet, Rfl: 2

## 2025-08-06 ENCOUNTER — APPOINTMENT (OUTPATIENT)
Dept: PRIMARY CARE | Facility: CLINIC | Age: 80
End: 2025-08-06
Payer: MEDICARE

## 2025-08-06 VITALS
HEART RATE: 71 BPM | WEIGHT: 135 LBS | OXYGEN SATURATION: 95 % | BODY MASS INDEX: 23.91 KG/M2 | TEMPERATURE: 97.6 F | SYSTOLIC BLOOD PRESSURE: 120 MMHG | RESPIRATION RATE: 16 BRPM | DIASTOLIC BLOOD PRESSURE: 60 MMHG

## 2025-08-06 DIAGNOSIS — F01.52 VASCULAR DEMENTIA WITH PSYCHOTIC DISTURBANCE, UNSPECIFIED DEMENTIA SEVERITY: ICD-10-CM

## 2025-08-06 DIAGNOSIS — E03.9 ACQUIRED HYPOTHYROIDISM: Primary | ICD-10-CM

## 2025-08-06 PROCEDURE — 99214 OFFICE O/P EST MOD 30 MIN: CPT | Performed by: INTERNAL MEDICINE

## 2025-08-06 PROCEDURE — 1159F MED LIST DOCD IN RCRD: CPT | Performed by: INTERNAL MEDICINE

## 2025-08-06 PROCEDURE — 1160F RVW MEDS BY RX/DR IN RCRD: CPT | Performed by: INTERNAL MEDICINE

## 2025-08-06 PROCEDURE — 1036F TOBACCO NON-USER: CPT | Performed by: INTERNAL MEDICINE

## 2025-08-06 RX ORDER — OLANZAPINE 7.5 MG/1
7.5 TABLET, FILM COATED ORAL NIGHTLY
Qty: 30 TABLET | Refills: 2 | Status: SHIPPED | OUTPATIENT
Start: 2025-08-06 | End: 2025-11-04

## 2025-08-06 ASSESSMENT — ENCOUNTER SYMPTOMS
CHILLS: 0
COUGH: 0
UNEXPECTED WEIGHT CHANGE: 0
ABDOMINAL PAIN: 0
WEAKNESS: 0
PALPITATIONS: 0
SHORTNESS OF BREATH: 0
NAUSEA: 0
CONSTIPATION: 0
JOINT SWELLING: 0
FATIGUE: 0
CONFUSION: 0
CHEST TIGHTNESS: 0
ARTHRALGIAS: 0
VOMITING: 0
WHEEZING: 0
DYSURIA: 0
SLEEP DISTURBANCE: 0
ADENOPATHY: 0
DIZZINESS: 0
SORE THROAT: 0
DIARRHEA: 0

## 2025-08-06 NOTE — PROGRESS NOTES
Subjective   Yumiko Richter is a 80 y.o. female who presents for Follow-up (6 weeks  follow  up  labs - 6.2025).  6 weeks  follow  up labs - 6.2025    Thomas in the room with  patient    Patient states  feels  same    Sleeping a lot , memory issues  same ,no more hallucination  Seen by neurology to have MRI in few days.  Seen by psychiatry too  Notes rev      Review of Systems   Constitutional:  Negative for chills, fatigue and unexpected weight change.        Comment   HENT:  Negative for congestion, ear pain and sore throat.    Respiratory:  Negative for cough, chest tightness, shortness of breath and wheezing.    Cardiovascular:  Negative for palpitations and leg swelling.   Gastrointestinal:  Negative for abdominal pain, constipation, diarrhea, nausea and vomiting.   Genitourinary:  Negative for dysuria and urgency.   Musculoskeletal:  Negative for arthralgias and joint swelling.   Skin:  Negative for rash.   Neurological:  Negative for dizziness and weakness.   Hematological:  Negative for adenopathy.   Psychiatric/Behavioral:  Negative for confusion and sleep disturbance.        Objective   Physical Exam  Constitutional:       Appearance: Normal appearance.   HENT:      Head: Normocephalic and atraumatic.     Eyes:      Pupils: Pupils are equal, round, and reactive to light.       Cardiovascular:      Rate and Rhythm: Normal rate and regular rhythm.   Pulmonary:      Effort: Pulmonary effort is normal.      Breath sounds: Normal breath sounds.     Musculoskeletal:         General: Normal range of motion.      Cervical back: Normal range of motion and neck supple.     Skin:     General: Skin is warm.     Neurological:      General: No focal deficit present.      Mental Status: She is alert and oriented to person, place, and time.     Psychiatric:         Mood and Affect: Mood normal.         Behavior: Behavior normal.       /60 (BP Location: Left arm, Patient Position: Sitting)   Pulse 71   Temp 36.4  °C (97.6 °F)   Resp 16   Wt 61.2 kg (135 lb)   SpO2 95%   BMI 23.91 kg/m²     Lab Results   Component Value Date    TSH 0.33 (L) 06/19/2025       Assessment/Plan   Problem List Items Addressed This Visit    None

## 2025-08-06 NOTE — ASSESSMENT & PLAN NOTE
No more psychosis, no more hallucination other then hearing music, sleeping too much .  Will try to decrease olanzapine to 7.5 mg and monitor  Fu with pshych  Per neurology less probably vascular dementia , since she just have auditory hallucination for a long time and no memory issues  To have mri

## 2025-08-07 LAB — TSH SERPL-ACNC: 1.73 MIU/L (ref 0.4–4.5)

## 2025-08-10 ENCOUNTER — HOSPITAL ENCOUNTER (OUTPATIENT)
Dept: RADIOLOGY | Facility: HOSPITAL | Age: 80
Discharge: HOME | End: 2025-08-10
Payer: MEDICARE

## 2025-08-10 DIAGNOSIS — R44.0 SEVERE AUDITORY HALLUCINATIONS: ICD-10-CM

## 2025-08-10 DIAGNOSIS — R93.0 ABNORMAL CT OF THE HEAD: ICD-10-CM

## 2025-08-10 PROCEDURE — 70551 MRI BRAIN STEM W/O DYE: CPT | Performed by: RADIOLOGY

## 2025-08-10 PROCEDURE — 70551 MRI BRAIN STEM W/O DYE: CPT

## 2025-08-28 ENCOUNTER — APPOINTMENT (OUTPATIENT)
Dept: BEHAVIORAL HEALTH | Facility: CLINIC | Age: 80
End: 2025-08-28
Payer: MEDICARE

## 2025-09-15 ENCOUNTER — APPOINTMENT (OUTPATIENT)
Dept: PRIMARY CARE | Facility: CLINIC | Age: 80
End: 2025-09-15
Payer: MEDICARE

## 2025-12-15 ENCOUNTER — APPOINTMENT (OUTPATIENT)
Dept: PRIMARY CARE | Facility: CLINIC | Age: 80
End: 2025-12-15
Payer: MEDICARE

## 2026-02-26 ENCOUNTER — APPOINTMENT (OUTPATIENT)
Dept: BEHAVIORAL HEALTH | Facility: CLINIC | Age: 81
End: 2026-02-26
Payer: MEDICARE

## 2026-03-11 ENCOUNTER — APPOINTMENT (OUTPATIENT)
Dept: PRIMARY CARE | Facility: CLINIC | Age: 81
End: 2026-03-11
Payer: MEDICARE